# Patient Record
Sex: MALE | Race: WHITE | NOT HISPANIC OR LATINO | Employment: FULL TIME | URBAN - METROPOLITAN AREA
[De-identification: names, ages, dates, MRNs, and addresses within clinical notes are randomized per-mention and may not be internally consistent; named-entity substitution may affect disease eponyms.]

---

## 2023-12-04 ENCOUNTER — OFFICE VISIT (OUTPATIENT)
Dept: FAMILY MEDICINE | Facility: CLINIC | Age: 23
End: 2023-12-04
Payer: COMMERCIAL

## 2023-12-04 VITALS
TEMPERATURE: 98.7 F | OXYGEN SATURATION: 96 % | SYSTOLIC BLOOD PRESSURE: 135 MMHG | RESPIRATION RATE: 16 BRPM | HEART RATE: 80 BPM | DIASTOLIC BLOOD PRESSURE: 83 MMHG

## 2023-12-04 DIAGNOSIS — R22.1 NECK MASS: Primary | ICD-10-CM

## 2023-12-04 DIAGNOSIS — R23.9 SKIN CHANGE: ICD-10-CM

## 2023-12-04 DIAGNOSIS — B35.3 TINEA PEDIS OF BOTH FEET: ICD-10-CM

## 2023-12-04 PROCEDURE — 99203 OFFICE O/P NEW LOW 30 MIN: CPT | Performed by: PHYSICIAN ASSISTANT

## 2023-12-04 NOTE — PROGRESS NOTES
Assessment & Plan:      1. Neck mass  Consistent with lipoma, will get us to evaluate  - US Head Neck Soft Tissue; Future    2. Tinea pedis of both feet  Otc lamisil x 2-3 weeks    3. Skin change  Darkened skin in groin area without erythema or pruritus.  Ok to monitor          At the end of the encounter, I discussed results, diagnosis, medications. Discussed red flags for immediate return to clinic/ER, as well as indications for follow up if no improvement. Patient understood and agreed to plan. Patient was stable for discharge.        Stefanie Sprague PA-C on 12/4/2023 at 4:59 PM          Subjective:     HPI:    Trae is a 23 year old male who presents to clinic today for the following health issues:  Chief Complaint   Patient presents with    Neck Problem     Lump on Rt back side of neck, x few weeks, pain when moving neck to right side, no swelling, not warm to the touch, lump a bit smaller than golf ball     HPI      History obtained from the patient.    1)Lump on right neck for the past month.  No known injury, no recent illness, has been unchanged and is not tender to the touch.  Does have some pain with turning head to the left    2)Bumps on toes for the past month, pruritic if he scratches but otherwise do not bother him, has not used otc treatments    3)Discolored skin in groin for the past few months, no pruritus or irritation,  no injury to area        Review of Systems   ROS: 10 point ROS neg other than the symptoms noted above in the HPI.     There is no problem list on file for this patient.       History reviewed. No pertinent past medical history.    Social History     Tobacco Use    Smoking status: Never    Smokeless tobacco: Never   Substance Use Topics    Alcohol use: Not on file             Objective:     Vitals:    12/04/23 1650   BP: 135/83   Pulse: 80   Resp: 16   Temp: 98.7  F (37.1  C)   TempSrc: Oral   SpO2: 96%         Physical Exam   EXAM:   Pleasant, alert, appropriate appearance.  NAD.  Head Exam: Normocephalic, atraumatic.  Eye Exam:  PERRLA, EOMI, non icteric/injection.    Ear Exam: TMs grey without bulging. Normal canals.  Normal pinna.  Nose Exam: Normal external nose.    OroPharynx Exam:  Moist mucous membranes. No erythema, pharynx without exudate or hypertrophy.  Neck/Thyroid Exam:  No LAD.  No nodules or enlargement.  Chest/Respiratory Exam: CTAB.  Cardiovascular Exam: RRR. No murmur or rubs.  Ext/musculoskeletal: Grossly intact, No edema, DP pulses 2+ equal bilateral.  Neuro: CN II-XII intact grossly intact.  Skin: right posterior neck with 3cm soft mass, nttp.    Bilateral halux with peeling skin and small papules.  Groin with darking of skin in pubic area above penis, no erythema or edema.        Results:  No results found for any visits on 12/04/23.

## 2023-12-08 ENCOUNTER — HOSPITAL ENCOUNTER (OUTPATIENT)
Dept: ULTRASOUND IMAGING | Facility: HOSPITAL | Age: 23
Discharge: HOME | End: 2023-12-08
Attending: PHYSICIAN ASSISTANT | Admitting: PHYSICIAN ASSISTANT
Payer: COMMERCIAL

## 2023-12-08 DIAGNOSIS — R22.1 NECK MASS: ICD-10-CM

## 2023-12-08 PROCEDURE — 76536 US EXAM OF HEAD AND NECK: CPT

## 2023-12-31 ENCOUNTER — HEALTH MAINTENANCE LETTER (OUTPATIENT)
Age: 23
End: 2023-12-31

## 2024-08-01 ENCOUNTER — OFFICE VISIT (OUTPATIENT)
Dept: FAMILY MEDICINE | Facility: CLINIC | Age: 24
End: 2024-08-01
Payer: COMMERCIAL

## 2024-08-01 VITALS
SYSTOLIC BLOOD PRESSURE: 133 MMHG | BODY MASS INDEX: 23.72 KG/M2 | HEIGHT: 70 IN | DIASTOLIC BLOOD PRESSURE: 85 MMHG | WEIGHT: 165.7 LBS | HEART RATE: 90 BPM | OXYGEN SATURATION: 100 % | RESPIRATION RATE: 16 BRPM | TEMPERATURE: 98 F

## 2024-08-01 DIAGNOSIS — F32.2 CURRENT SEVERE EPISODE OF MAJOR DEPRESSIVE DISORDER WITHOUT PSYCHOTIC FEATURES WITHOUT PRIOR EPISODE (H): Primary | ICD-10-CM

## 2024-08-01 PROCEDURE — G2211 COMPLEX E/M VISIT ADD ON: HCPCS | Performed by: NURSE PRACTITIONER

## 2024-08-01 PROCEDURE — 96127 BRIEF EMOTIONAL/BEHAV ASSMT: CPT | Performed by: NURSE PRACTITIONER

## 2024-08-01 PROCEDURE — 99213 OFFICE O/P EST LOW 20 MIN: CPT | Performed by: NURSE PRACTITIONER

## 2024-08-01 RX ORDER — BUPROPION HYDROCHLORIDE 150 MG/1
150 TABLET ORAL EVERY MORNING
Qty: 30 TABLET | Refills: 1 | Status: SHIPPED | OUTPATIENT
Start: 2024-08-01 | End: 2024-09-25

## 2024-08-01 ASSESSMENT — PATIENT HEALTH QUESTIONNAIRE - PHQ9
10. IF YOU CHECKED OFF ANY PROBLEMS, HOW DIFFICULT HAVE THESE PROBLEMS MADE IT FOR YOU TO DO YOUR WORK, TAKE CARE OF THINGS AT HOME, OR GET ALONG WITH OTHER PEOPLE: VERY DIFFICULT
SUM OF ALL RESPONSES TO PHQ QUESTIONS 1-9: 21
SUM OF ALL RESPONSES TO PHQ QUESTIONS 1-9: 21

## 2024-08-01 NOTE — PROGRESS NOTES
Assessment & Plan     Current severe episode of major depressive disorder without psychotic features without prior episode (H)  Will trial wellbutrin to help with depression and adhd symptoms. We did discuss that this can contribute to worsening anxiety however patients main depression symptom is lack of motivation and desire to do anything and I think it will help with this symptoms. We did discuss adding trazodone for sleep-but will wait and see if that improves with depression treatment. We did discuss to take wellbutrin in the AM and not after 4 pm.     Continue therapy.    We did discuss that could increase dose of wellbutrin to 300 mg if not seeing effects at 300 mg. If getting anxious with wellbutrin will switch to ssri     Follow up to establish care in 4-6 week and recheck medication.    - buPROPion (WELLBUTRIN XL) 150 MG 24 hr tablet; Take 1 tablet (150 mg) by mouth every morning          Depression Screening Follow Up        8/1/2024     7:04 AM   PHQ   PHQ-9 Total Score 21   Q9: Thoughts of better off dead/self-harm past 2 weeks Not at all           Follow Up Actions Taken  Crisis resource information provided in After Visit Summary  Started patient on anti-depressant.           Subjective   Trae is a 23 year old, presenting for the following health issues:  Depression (Depression. Has been seeing a psychologist through [facility] for the past few months and has been diagnosed with moderate depression. Wants to pursue a medication treatment option- would like to discuss the next steps for that. Also has been having issues with insomnia since January of this year. )        8/1/2024     7:16 AM   Additional Questions   Roomed by Jessica     History of Present Illness       Mental Health Follow-up:  Patient presents to follow-up on Depression.Patient's depression since last visit has been:  No change  The patient is having other symptoms associated with depression.      Any  "significant life events: No  Patient is not feeling anxious or having panic attacks.  Patient has no concerns about alcohol or drug use.    Reason for visit:  Recently diagnosed with Major Depression and Insomnia  Symptom onset:  More than a month  Symptoms include:  Isolation; low motivation; low affect; difficulty falling asleep, staying asleep, and waking early  Symptom intensity:  Moderate  Symptom progression:  Staying the same  Had these symptoms before:  Yes  Has tried/received treatment for these symptoms:  No  What makes it worse:  No  What makes it better:  No    He eats 0-1 servings of fruits and vegetables daily.He consumes 1 sweetened beverage(s) daily.He exercises with enough effort to increase his heart rate 30 to 60 minutes per day.  He exercises with enough effort to increase his heart rate 4 days per week.   He is taking medications regularly.     Has never previously tried medications for depression and anxiety.  No thoughts of hurting self or others.    Insomnia-feels like has been noticeable since the 1st of this year-mixed bag of not being able to fall asleep quickly. Has tried melatonin 5 mg , 10mg, diphenhydramine (made more anxious restless). Has tried sleep hygiene-being in bed not on phone. Doesn't seem like racing thoughts-seems exhausted but can't fall asleep.                   Objective    /85 (BP Location: Right arm, Patient Position: Sitting, Cuff Size: Adult Regular)   Pulse 90   Temp 98  F (36.7  C) (Oral)   Resp 16   Ht 1.778 m (5' 10\")   Wt 75.2 kg (165 lb 11.2 oz)   SpO2 100%   BMI 23.78 kg/m    Body mass index is 23.78 kg/m .  Physical Exam  Constitutional:       Appearance: Normal appearance.   Cardiovascular:      Rate and Rhythm: Normal rate and regular rhythm.   Neurological:      General: No focal deficit present.      Mental Status: He is alert and oriented to person, place, and time.   Psychiatric:         Mood and Affect: Mood normal.         Behavior: " Behavior normal.         Thought Content: Thought content normal.            8/1/2024     7:04 AM   PHQ   PHQ-9 Total Score 21   Q9: Thoughts of better off dead/self-harm past 2 weeks Not at all           No data to display                             Signed Electronically by: IZZY HERNANDEZ CNP

## 2024-08-19 ENCOUNTER — TELEPHONE (OUTPATIENT)
Dept: FAMILY MEDICINE | Facility: CLINIC | Age: 24
End: 2024-08-19
Payer: COMMERCIAL

## 2024-08-19 DIAGNOSIS — F32.2 CURRENT SEVERE EPISODE OF MAJOR DEPRESSIVE DISORDER WITHOUT PSYCHOTIC FEATURES WITHOUT PRIOR EPISODE (H): Primary | ICD-10-CM

## 2024-08-19 NOTE — TELEPHONE ENCOUNTER
Reason for Call:  Other call back    Detailed comments: PT CALLING TODAY ABOUT MEDS PT HAS NOT HAD ANY IMPROVEMENT WITH MEDS AND WONDERING TO INCREASE MEDS TO 300MG PLEASE CALL AND ADVISE PT     Phone Number Patient can be reached at: Cell number on file:    Telephone Information:   Mobile [phone]       Best Time: ANYTIME    Can we leave a detailed message on this number? YES    Call taken on 8/19/2024 at 5:16 PM by Andrey Devi

## 2024-08-20 RX ORDER — BUPROPION HYDROCHLORIDE 150 MG/1
300 TABLET ORAL EVERY MORNING
Qty: 30 TABLET | Refills: 1 | Status: SHIPPED | OUTPATIENT
Start: 2024-08-20 | End: 2024-09-25

## 2024-08-20 NOTE — TELEPHONE ENCOUNTER
Last OV 8/1/2024:        Please advise if you would like to increase dose of Bupropion. Est Care Appointment for 9/17 with Angela.

## 2024-08-23 DIAGNOSIS — F32.2 CURRENT SEVERE EPISODE OF MAJOR DEPRESSIVE DISORDER WITHOUT PSYCHOTIC FEATURES WITHOUT PRIOR EPISODE (H): ICD-10-CM

## 2024-08-23 RX ORDER — BUPROPION HYDROCHLORIDE 150 MG/1
150 TABLET ORAL EVERY MORNING
Qty: 90 TABLET | Refills: 1 | OUTPATIENT
Start: 2024-08-23

## 2024-08-29 DIAGNOSIS — F32.2 CURRENT SEVERE EPISODE OF MAJOR DEPRESSIVE DISORDER WITHOUT PSYCHOTIC FEATURES WITHOUT PRIOR EPISODE (H): ICD-10-CM

## 2024-08-29 RX ORDER — BUPROPION HYDROCHLORIDE 150 MG/1
300 TABLET ORAL EVERY MORNING
Qty: 180 TABLET | Refills: 1 | OUTPATIENT
Start: 2024-08-29

## 2024-09-25 ENCOUNTER — OFFICE VISIT (OUTPATIENT)
Dept: FAMILY MEDICINE | Facility: CLINIC | Age: 24
End: 2024-09-25
Payer: COMMERCIAL

## 2024-09-25 VITALS
TEMPERATURE: 98.9 F | SYSTOLIC BLOOD PRESSURE: 130 MMHG | DIASTOLIC BLOOD PRESSURE: 70 MMHG | BODY MASS INDEX: 24.01 KG/M2 | WEIGHT: 167.7 LBS | HEART RATE: 89 BPM | RESPIRATION RATE: 14 BRPM | HEIGHT: 70 IN | OXYGEN SATURATION: 98 %

## 2024-09-25 DIAGNOSIS — F51.01 PRIMARY INSOMNIA: ICD-10-CM

## 2024-09-25 DIAGNOSIS — Z11.59 NEED FOR HEPATITIS C SCREENING TEST: ICD-10-CM

## 2024-09-25 DIAGNOSIS — F32.2 CURRENT SEVERE EPISODE OF MAJOR DEPRESSIVE DISORDER WITHOUT PSYCHOTIC FEATURES WITHOUT PRIOR EPISODE (H): ICD-10-CM

## 2024-09-25 DIAGNOSIS — Z11.4 SCREENING FOR HIV (HUMAN IMMUNODEFICIENCY VIRUS): ICD-10-CM

## 2024-09-25 DIAGNOSIS — Z00.00 ROUTINE GENERAL MEDICAL EXAMINATION AT A HEALTH CARE FACILITY: Primary | ICD-10-CM

## 2024-09-25 DIAGNOSIS — R41.840 ATTENTION AND CONCENTRATION DEFICIT: ICD-10-CM

## 2024-09-25 DIAGNOSIS — Z13.220 SCREENING FOR HYPERLIPIDEMIA: ICD-10-CM

## 2024-09-25 LAB
CHOLEST SERPL-MCNC: 162 MG/DL
FASTING STATUS PATIENT QL REPORTED: YES
HCV AB SERPL QL IA: NONREACTIVE
HDLC SERPL-MCNC: 47 MG/DL
HIV 1+2 AB+HIV1 P24 AG SERPL QL IA: NONREACTIVE
LDLC SERPL CALC-MCNC: 92 MG/DL
NONHDLC SERPL-MCNC: 115 MG/DL
TRIGL SERPL-MCNC: 115 MG/DL

## 2024-09-25 PROCEDURE — 90471 IMMUNIZATION ADMIN: CPT | Performed by: STUDENT IN AN ORGANIZED HEALTH CARE EDUCATION/TRAINING PROGRAM

## 2024-09-25 PROCEDURE — 90472 IMMUNIZATION ADMIN EACH ADD: CPT | Performed by: STUDENT IN AN ORGANIZED HEALTH CARE EDUCATION/TRAINING PROGRAM

## 2024-09-25 PROCEDURE — 99395 PREV VISIT EST AGE 18-39: CPT | Mod: 57 | Performed by: STUDENT IN AN ORGANIZED HEALTH CARE EDUCATION/TRAINING PROGRAM

## 2024-09-25 PROCEDURE — 91320 SARSCV2 VAC 30MCG TRS-SUC IM: CPT | Performed by: STUDENT IN AN ORGANIZED HEALTH CARE EDUCATION/TRAINING PROGRAM

## 2024-09-25 PROCEDURE — 90480 ADMN SARSCOV2 VAC 1/ONLY CMP: CPT | Performed by: STUDENT IN AN ORGANIZED HEALTH CARE EDUCATION/TRAINING PROGRAM

## 2024-09-25 PROCEDURE — 99214 OFFICE O/P EST MOD 30 MIN: CPT | Mod: 25 | Performed by: STUDENT IN AN ORGANIZED HEALTH CARE EDUCATION/TRAINING PROGRAM

## 2024-09-25 PROCEDURE — 87389 HIV-1 AG W/HIV-1&-2 AB AG IA: CPT | Performed by: STUDENT IN AN ORGANIZED HEALTH CARE EDUCATION/TRAINING PROGRAM

## 2024-09-25 PROCEDURE — 86803 HEPATITIS C AB TEST: CPT | Performed by: STUDENT IN AN ORGANIZED HEALTH CARE EDUCATION/TRAINING PROGRAM

## 2024-09-25 PROCEDURE — 80061 LIPID PANEL: CPT | Performed by: STUDENT IN AN ORGANIZED HEALTH CARE EDUCATION/TRAINING PROGRAM

## 2024-09-25 PROCEDURE — 90656 IIV3 VACC NO PRSV 0.5 ML IM: CPT | Performed by: STUDENT IN AN ORGANIZED HEALTH CARE EDUCATION/TRAINING PROGRAM

## 2024-09-25 PROCEDURE — 36415 COLL VENOUS BLD VENIPUNCTURE: CPT | Performed by: STUDENT IN AN ORGANIZED HEALTH CARE EDUCATION/TRAINING PROGRAM

## 2024-09-25 PROCEDURE — 90715 TDAP VACCINE 7 YRS/> IM: CPT | Performed by: STUDENT IN AN ORGANIZED HEALTH CARE EDUCATION/TRAINING PROGRAM

## 2024-09-25 RX ORDER — BUPROPION HYDROCHLORIDE 150 MG/1
450 TABLET ORAL EVERY MORNING
Qty: 270 TABLET | Refills: 3 | Status: SHIPPED | OUTPATIENT
Start: 2024-09-25 | End: 2024-10-01

## 2024-09-25 NOTE — PROGRESS NOTES
Preventive Care Visit  [facility]  Bill Miller MD, Family Medicine  Sep 25, 2024      Assessment & Plan     Routine general medical examination at a health care facility  Trae is a 23-year-old male who is presenting today for adult preventative exam, and establishing care.  He has a history of depression, more on this below.  We discussed screening as recommended per USPSTF guidelines for HIV, hepatitis C and hyperlipidemia, he was agreeable to having testing for these.    Recommended vaccinations for influenza, COVID and tetanus per CDC guidelines, he was agreeable and these were given today in clinic.    Examination today was grossly normal.    Screening for HIV (human immunodeficiency virus)    - HIV Antigen Antibody Combo    Need for hepatitis C screening test    - Hepatitis C Screen Reflex to HCV RNA Quant and Genotype    Screening for hyperlipidemia    - Lipid panel reflex to direct LDL Fasting    Current severe episode of major depressive disorder without psychotic features without prior episode (H)    Has been improved on Wellbutrin 300 mg XL daily.  Reports no side effects however still having some depression symptoms.  Like to see if he can go up on the medication dosing, I am agreeable to going up to 450 mg daily, monitoring for side effects as well as improvement over the next month.  He will check in with me in 1 month to notify me how things are going.  If they are not improving or there are side effects, would consider go back down to 300 mg Wellbutrin and add SSRI versus SNRI.  He is already seen a therapist, encouraged him to continue seeing his therapist.        - buPROPion (WELLBUTRIN XL) 150 MG 24 hr tablet  Dispense: 270 tablet; Refill: 3      Primary insomnia    Has been an issue with sleep initiation and sleep maintenance.  Has not improved with multiple different over-the-counter remedies to include melatonin, diphenhydramine and CBD Gummies.  Encouraged him to  attempt CBT-I, he will attempt sleepio karlo.  Discussed not sleeping until 30 minutes after he usually does go to sleep for 1 week and then slowly backing the time 15 minutes/week down until he is at goal of 8 hours sleep per night.    If he is not able to improve with this, would recommend for doxepin.  He will follow-up with me in 1 month    Attention and concentration deficit    He and therapist suspect that he has ADHD, they are working on getting him psychological testing for this, I encouraged the patient to follow-up with me once he has a diagnosis of ADHD, if he does get 1, and I am happy to help with pharmacotherapy.            Counseling  Appropriate preventive services were addressed with this patient via screening, questionnaire, or discussion as appropriate for fall prevention, nutrition, physical activity, Tobacco-use cessation, social engagement, weight loss and cognition.  Checklist reviewing preventive services available has been given to the patient.  Reviewed patient's diet, addressing concerns and/or questions.           Rohan Larkin is a 23 year old, presenting for the following:  Physical (ADHD, depression, and difficulty falling, staying asleep, and waking early.) and Establish Care        9/25/2024    12:39 PM   Additional Questions   Roomed by MARIA R FRANCES        Health Care Directive  Patient does not have a Health Care Directive or Living Will: Discussed advance care planning with patient; information given to patient to review.    HPI  Notices that his depression has improved.  Concentration has been a little better, can't concentrate more than an hour, but can focus more than 30 minutes which is an improvement.  He has been more active, doing more things out of the house. Has been working with a therapist since March 2024, is working on getting into a psychologist through them. Notes that over the past couple of years, that his memory is not as good as it was before. He will need to  look things up.     Has been having an issue with sleep since the start of 2024, but has been off and on a problem throughout his life.  Has been having issues with staying asleep, but also going to sleep.  He has been documenting this, since August 1st, had only 5 nights with 8 hours of sleep or more.  Sleep average is  usually about 6.5 to 7.5 hours a day. Sleep latency is 30 minutes to two hours, unaware of any variables that worsen this.  Has attempted melatonin, CBD gummies and benadryl without consistent results.  Melatonin seems to help the most.  Has discussed this with his therapist but not focusing therapy on this issue.             9/22/2024   General Health   How would you rate your overall physical health? Good   Feel stress (tense, anxious, or unable to sleep) To some extent      (!) STRESS CONCERN      9/22/2024   Nutrition   Three or more servings of calcium each day? Yes   Diet: Regular (no restrictions)   How many servings of fruit and vegetables per day? (!) 0-1   How many sweetened beverages each day? 0-1            9/22/2024   Exercise   Days per week of moderate/strenous exercise 4 days   Average minutes spent exercising at this level 60 min            9/22/2024   Social Factors   Frequency of gathering with friends or relatives Three times a week   Worry food won't last until get money to buy more No   Food not last or not have enough money for food? No   Do you have housing? (Housing is defined as stable permanent housing and does not include staying ouside in a car, in a tent, in an abandoned building, in an overnight shelter, or couch-surfing.) Yes   Are you worried about losing your housing? No   Lack of transportation? No   Unable to get utilities (heat,electricity)? No            9/22/2024   Dental   Dentist two times every year? Yes            9/22/2024   TB Screening   Were you born outside of the [place]? No            Today's PHQ-9 Score:       8/1/2024     7:04 AM   PHQ-9 SCORE   PHQ-9  Total Score MyChart 21 (Severe depression)   PHQ-9 Total Score 21           9/22/2024   Substance Use   Alcohol more than 3/day or more than 7/wk No   Do you use any other substances recreationally? (!) CANNABIS PRODUCTS        Social History     Tobacco Use    Smoking status: Never     Passive exposure: Never    Smokeless tobacco: Never   Vaping Use    Vaping status: Never Used             9/22/2024   One time HIV Screening   Previous HIV test? Yes          9/22/2024   STI Screening   New sexual partner(s) since last STI/HIV test? No            9/22/2024   Contraception/Family Planning   Questions about contraception or family planning No           Reviewed and updated as needed this visit by Provider                    Past Medical History:   Diagnosis Date    Depressive disorder July 1     History reviewed. No pertinent surgical history.  Labs reviewed in EPIC  BP Readings from Last 3 Encounters:   09/25/24 130/70   08/01/24 133/85   12/04/23 135/83    Wt Readings from Last 3 Encounters:   09/25/24 76.1 kg (167 lb 11.2 oz)   08/01/24 75.2 kg (165 lb 11.2 oz)                  Patient Active Problem List   Diagnosis    Hearing loss     History reviewed. No pertinent surgical history.    Social History     Tobacco Use    Smoking status: Never     Passive exposure: Never    Smokeless tobacco: Never   Substance Use Topics    Alcohol use: Yes     Alcohol/week: 1.0 - 2.0 standard drink of alcohol     Types: 1 - 2 Standard drinks or equivalent per week     Comment: 1x per week,     Family History   Problem Relation Age of Onset    Memory loss Mother         from a zoologic disease?    Parkinsonism Father 43    Depression Brother     Anxiety Disorder Brother     Mental Illness Brother     Insomnia Brother     Other Cancer Maternal Grandmother         Pancreatic cancer         No current outpatient medications on file.     No Known Allergies  No lab results found.       Review of Systems  Constitutional, neuro, ENT,  "endocrine, pulmonary, cardiac, gastrointestinal, genitourinary, musculoskeletal, integument and psychiatric systems are negative, except as otherwise noted.     Objective    Exam  /70 (BP Location: Right arm, Patient Position: Sitting, Cuff Size: Adult Regular)   Pulse 89   Temp 98.9  F (37.2  C) (Oral)   Resp 14   Ht 1.778 m (5' 10\")   Wt 76.1 kg (167 lb 11.2 oz)   SpO2 98%   BMI 24.06 kg/m     Estimated body mass index is 24.06 kg/m  as calculated from the following:    Height as of this encounter: 1.778 m (5' 10\").    Weight as of this encounter: 76.1 kg (167 lb 11.2 oz).    Physical Exam  GENERAL: alert and no distress  EYES: Eyes grossly normal to inspection, PERRL and conjunctivae and sclerae normal  HENT: ear canals and TM's normal, nose and mouth without ulcers or lesions  NECK: no adenopathy, no asymmetry, or scars.  Has 3 x 3 cm soft mobile mass at the same location where he had a neck ultrasound done last year showing lipoma.  RESP: lungs clear to auscultation - no rales, rhonchi or wheezes  CV: regular rate and rhythm, normal S1 S2, no S3 or S4, no murmur, click or rub, no peripheral edema  ABDOMEN: soft, nontender, no hepatosplenomegaly, no masses and bowel sounds normal  MS: no gross musculoskeletal defects noted, no edema  SKIN: no suspicious lesions or rashes  NEURO: Normal strength and tone, mentation intact and speech normal  PSYCH: mentation appears normal, affect normal/bright        Signed Electronically by: Bill Miller MD    "

## 2024-09-25 NOTE — PATIENT INSTRUCTIONS
Try the Sleepio karlo.  Try this behavioral intervention. If no improvement after one month, would try doxepin.   Patient Education   Preventive Care Advice   This is general advice given by our system to help you stay healthy. However, your care team may have specific advice just for you. Please talk to your care team about your preventive care needs.  Nutrition  Eat 5 or more servings of fruits and vegetables each day.  Try wheat bread, brown rice and whole grain pasta (instead of white bread, rice, and pasta).  Get enough calcium and vitamin D. Check the label on foods and aim for 100% of the RDA (recommended daily allowance).  Lifestyle  Exercise at least 150 minutes each week  (30 minutes a day, 5 days a week).  Do muscle strengthening activities 2 days a week. These help control your weight and prevent disease.  No smoking.  Wear sunscreen to prevent skin cancer.  Have a dental exam and cleaning every 6 months.  Yearly exams  See your health care team every year to talk about:  Any changes in your health.  Any medicines your care team has prescribed.  Preventive care, family planning, and ways to prevent chronic diseases.  Shots (vaccines)   HPV shots (up to age 26), if you've never had them before.  Hepatitis B shots (up to age 59), if you've never had them before.  COVID-19 shot: Get this shot when it's due.  Flu shot: Get a flu shot every year.  Tetanus shot: Get a tetanus shot every 10 years.  Pneumococcal, hepatitis A, and RSV shots: Ask your care team if you need these based on your risk.  Shingles shot (for age 50 and up)  General health tests  Diabetes screening:  Starting at age 35, Get screened for diabetes at least every 3 years.  If you are younger than age 35, ask your care team if you should be screened for diabetes.  Cholesterol test: At age 39, start having a cholesterol test every 5 years, or more often if advised.  Bone density scan (DEXA): At age 50, ask your care team if you should have this  scan for osteoporosis (brittle bones).  Hepatitis C: Get tested at least once in your life.  STIs (sexually transmitted infections)  Before age 24: Ask your care team if you should be screened for STIs.  After age 24: Get screened for STIs if you're at risk. You are at risk for STIs (including HIV) if:  You are sexually active with more than one person.  You don't use condoms every time.  You or a partner was diagnosed with a sexually transmitted infection.  If you are at risk for HIV, ask about PrEP medicine to prevent HIV.  Get tested for HIV at least once in your life, whether you are at risk for HIV or not.  Cancer screening tests  Cervical cancer screening: If you have a cervix, begin getting regular cervical cancer screening tests starting at age 21.  Breast cancer scan (mammogram): If you've ever had breasts, begin having regular mammograms starting at age 40. This is a scan to check for breast cancer.  Colon cancer screening: It is important to start screening for colon cancer at age 45.  Have a colonoscopy test every 10 years (or more often if you're at risk) Or, ask your provider about stool tests like a FIT test every year or Cologuard test every 3 years.  To learn more about your testing options, visit:   .  For help making a decision, visit:   https://bit.ly/ql04226.  Prostate cancer screening test: If you have a prostate, ask your care team if a prostate cancer screening test (PSA) at age 55 is right for you.  Lung cancer screening: If you are a current or former smoker ages 50 to 80, ask your care team if ongoing lung cancer screenings are right for you.  For informational purposes only. Not to replace the advice of your health care provider. Copyright   2023 [employer]. All rights reserved. Clinically reviewed by the [facility]. [employer] [id number] - REV 01/24.  Substance Use Disorder: Care Instructions  Overview     You can improve your life and health by  stopping your use of alcohol or drugs. When you don't drink or use drugs, you may feel and sleep better. You may get along better with your family, friends, and coworkers. There are medicines and programs that can help with substance use disorder.  How can you care for yourself at home?  Here are some ways to help you stay sober and prevent relapse.  If you have been given medicine to help keep you sober or reduce your cravings, be sure to take it exactly as prescribed.  Talk to your doctor about programs that can help you stop using drugs or drinking alcohol.  Do not keep alcohol or drugs in your home.  Plan ahead. Think about what you'll say if other people ask you to drink or use drugs. Try not to spend time with people who drink or use drugs.  Use the time and money spent on drinking or drugs to do something that's important to you.  Preventing a relapse  Have a plan to deal with relapse. Learn to recognize changes in your thinking that lead you to drink or use drugs. Get help before you start to drink or use drugs again.  Try to stay away from situations, friends, or places that may lead you to drink or use drugs.  If you feel the need to drink alcohol or use drugs again, seek help right away. Call a trusted friend or family member. Some people get support from organizations such as Narcotics Anonymous or [employer] or from treatment facilities.  If you relapse, get help as soon as you can. Some people make a plan with another person that outlines what they want that person to do for them if they relapse. The plan usually includes how to handle the relapse and who to notify in case of relapse.  Don't give up. Remember that a relapse doesn't mean that you have failed. Use the experience to learn the triggers that lead you to drink or use drugs. Then quit again. Recovery is a lifelong process. Many people have several relapses before they are able to quit for good.  Follow-up care is a key part of your  "treatment and safety. Be sure to make and go to all appointments, and call your doctor if you are having problems. It's also a good idea to know your test results and keep a list of the medicines you take.  When should you call for help?   Call 911  anytime you think you may need emergency care. For example, call if you or someone else:    Has overdosed or has withdrawal signs. Be sure to tell the emergency workers that you are or someone else is using or trying to quit using drugs. Overdose or withdrawal signs may include:  Losing consciousness.  Seizure.  Seeing or hearing things that aren't there (hallucinations).     Is thinking or talking about suicide or harming others.   Where to get help 24 hours a day, 7 days a week   If you or someone you know talks about suicide, self-harm, a mental health crisis, a substance use crisis, or any other kind of emotional distress, get help right away. You can:    Call the Suicide and Crisis Lifeline at 776.     Call 6-400-501-TALK (1-808.686.7335).     Text HOME to 745918 to access the Crisis Text Line.   Consider saving these numbers in your phone.  Go to Flowline for more information or to chat online.  Call your doctor now or seek immediate medical care if:    You are having withdrawal symptoms. These may include nausea or vomiting, sweating, shakiness, and anxiety.   Watch closely for changes in your health, and be sure to contact your doctor if:    You have a relapse.     You need more help or support to stop.   Where can you learn more?  Go to https://www.[facility].net/patiented  Enter H573 in the search box to learn more about \"Substance Use Disorder: Care Instructions.\"  Current as of: November 15, 2023               Content Version: 14.0    9758-0468 [employer].   Care instructions adapted under license by your healthcare professional. If you have questions about a medical condition or this instruction, always ask your healthcare professional. " [employer] disclaims any warranty or liability for your use of this information.

## 2024-09-29 ENCOUNTER — MYC MEDICAL ADVICE (OUTPATIENT)
Dept: FAMILY MEDICINE | Facility: CLINIC | Age: 24
End: 2024-09-29
Payer: COMMERCIAL

## 2024-09-30 NOTE — TELEPHONE ENCOUNTER
Outgoing call to patient's number in chart. When trying to call it said the Number is not in service.     Will respond on my chart and have the patient call us back so we can triage him further.     Anju PERAZA RN

## 2024-10-01 DIAGNOSIS — F32.2 CURRENT SEVERE EPISODE OF MAJOR DEPRESSIVE DISORDER WITHOUT PSYCHOTIC FEATURES WITHOUT PRIOR EPISODE (H): ICD-10-CM

## 2024-10-01 RX ORDER — BUPROPION HYDROCHLORIDE 150 MG/1
150 TABLET ORAL EVERY MORNING
Qty: 90 TABLET | Refills: 3 | Status: SHIPPED | OUTPATIENT
Start: 2024-10-01

## 2024-10-29 ENCOUNTER — MYC MEDICAL ADVICE (OUTPATIENT)
Dept: FAMILY MEDICINE | Facility: CLINIC | Age: 24
End: 2024-10-29
Payer: COMMERCIAL

## 2024-10-29 DIAGNOSIS — F51.01 PRIMARY INSOMNIA: Primary | ICD-10-CM

## 2024-10-29 RX ORDER — DOXEPIN HYDROCHLORIDE 10 MG/1
10 CAPSULE ORAL AT BEDTIME
Qty: 30 CAPSULE | Refills: 3 | Status: SHIPPED | OUTPATIENT
Start: 2024-10-29

## 2024-10-29 NOTE — TELEPHONE ENCOUNTER
Wrote the below message to patient directly:    Trae,    I am sorry to hear that you are not getting much better sleep.  I think we should try doxepin.  The typical dose of 3 or 6 mg is not covered by your insurance, because it is very expensive.  However the dose of 10 mg, which is less ideal but is still able to help significantly with insomnia, is covered.  Unfortunately it is a capsule, you will not be able to cut it in half to get a smaller dose.    If you be willing to give this a try, I would recommend taking doxepin 30 minutes before bedtime.  I would give this a try for a couple of weeks and see if there is improvement after regular nightly use.    If not seeing any improvement after a couple of weeks please let me know.    Best regards,    Bill Miller MD

## 2024-11-21 DIAGNOSIS — F51.01 PRIMARY INSOMNIA: ICD-10-CM

## 2024-11-21 RX ORDER — DOXEPIN HYDROCHLORIDE 10 MG/1
10 CAPSULE ORAL AT BEDTIME
Qty: 90 CAPSULE | Refills: 2 | Status: SHIPPED | OUTPATIENT
Start: 2024-11-21

## 2025-01-08 ENCOUNTER — MYC MEDICAL ADVICE (OUTPATIENT)
Dept: FAMILY MEDICINE | Facility: CLINIC | Age: 25
End: 2025-01-08
Payer: COMMERCIAL

## 2025-01-08 DIAGNOSIS — F51.01 PRIMARY INSOMNIA: Primary | ICD-10-CM

## 2025-01-08 DIAGNOSIS — F32.2 CURRENT SEVERE EPISODE OF MAJOR DEPRESSIVE DISORDER WITHOUT PSYCHOTIC FEATURES WITHOUT PRIOR EPISODE (H): ICD-10-CM

## 2025-01-08 RX ORDER — DOXEPIN HYDROCHLORIDE 10 MG/ML
3 SOLUTION ORAL AT BEDTIME
Qty: 118 ML | Refills: 0 | Status: SHIPPED | OUTPATIENT
Start: 2025-01-08

## 2025-01-08 RX ORDER — DOXEPIN 3 MG/1
3 TABLET, FILM COATED ORAL
Qty: 30 TABLET | Refills: 3 | Status: SHIPPED | OUTPATIENT
Start: 2025-01-08

## 2025-01-08 RX ORDER — BUPROPION HYDROCHLORIDE 450 MG/1
450 TABLET, FILM COATED, EXTENDED RELEASE ORAL EVERY MORNING
Qty: 90 TABLET | Refills: 3 | Status: SHIPPED | OUTPATIENT
Start: 2025-01-08

## 2025-01-08 NOTE — TELEPHONE ENCOUNTER
October 29, 2024  Bill Miller MD   AB    10/29/24  3:53 PM  Note  Wrote the below message to patient directly:     Trae,     I am sorry to hear that you are not getting much better sleep.  I think we should try doxepin.  The typical dose of 3 or 6 mg is not covered by your insurance, because it is very expensive.  However the dose of 10 mg, which is less ideal but is still able to help significantly with insomnia, is covered.  Unfortunately it is a capsule, you will not be able to cut it in half to get a smaller dose.     If you be willing to give this a try, I would recommend taking doxepin 30 minutes before bedtime.  I would give this a try for a couple of weeks and see if there is improvement after regular nightly use.     If not seeing any improvement after a couple of weeks please let me know.     Best regards,     Bill Miller MD

## 2025-01-09 NOTE — TELEPHONE ENCOUNTER
I have updated the prescription so that he is getting for 150 mg bupropion again.      I have changed the prescription of doxepin, because the 10 mg capsule was not working, we will try to get him doxepin 3 mg to take at night, anticipate improvement in insomnia.  If still having problems getting the 3 mg tablet, I have also ordered the 10 mg/mL doxepin solution which she can take 0.3 mL often get the same dosage.

## 2025-02-11 ENCOUNTER — E-VISIT (OUTPATIENT)
Dept: FAMILY MEDICINE | Facility: CLINIC | Age: 25
End: 2025-02-11
Payer: COMMERCIAL

## 2025-02-11 DIAGNOSIS — Z71.1 CONCERN ABOUT MEMORY: Primary | ICD-10-CM

## 2025-02-11 DIAGNOSIS — R41.840 ATTENTION AND CONCENTRATION DEFICIT: ICD-10-CM

## 2025-02-11 NOTE — PATIENT INSTRUCTIONS
Thank you for choosing us for your care. I have placed the below referral(s) for you:  Orders Placed This Encounter   Procedures     Adult Mental Health  Referral     Adult Mental Health  Referral     1 of these is for ADHD evaluation, the other is for neuropsych evaluation where they will do testing to see if there is memory loss.    Please click the link for your After Visit Summary to view scheduling instructions for your referral. In most cases, you will be contacted within 2 business days to schedule. If you do not hear from a representative within that time, please call [phone] to be connected to a .

## 2025-02-14 ENCOUNTER — MYC MEDICAL ADVICE (OUTPATIENT)
Dept: FAMILY MEDICINE | Facility: CLINIC | Age: 25
End: 2025-02-14
Payer: COMMERCIAL

## 2025-02-17 ENCOUNTER — MYC MEDICAL ADVICE (OUTPATIENT)
Dept: FAMILY MEDICINE | Facility: CLINIC | Age: 25
End: 2025-02-17
Payer: COMMERCIAL

## 2025-02-17 DIAGNOSIS — R41.840 ATTENTION AND CONCENTRATION DEFICIT: Primary | ICD-10-CM

## 2025-02-17 NOTE — TELEPHONE ENCOUNTER
I placed a new referral to [facility] for the patient to get his ADHD evaluation done there.  I notified him through patient message.  Closing note.

## 2025-02-17 NOTE — TELEPHONE ENCOUNTER
E-visit 2/11/25  Thank you for choosing us for your care. I have placed the below referral(s) for you:      Orders Placed This Encounter   Procedures    Adult Mental Health  Referral    Adult Mental Health  Referral      1 of these is for ADHD evaluation, the other is for neuropsych evaluation where they will do testing to see if there is memory loss.     Please click the link for your After Visit Summary to view scheduling instructions for your referral. In most cases, you will be contacted within 2 business days to schedule. If you do not hear from a representative within that time, please call [phone] to be connected to a .

## 2025-02-17 NOTE — PROGRESS NOTES
Glad to hear the neuropsychology referral worked out, I will place a new referral for ADHD evaluation to [facility].  You can reach them by calling at [phone].

## 2025-02-18 NOTE — TELEPHONE ENCOUNTER
Please have him attempt to reach back out to the neuropsychology clinic at [place] see if there are any other providers that be willing to take him on.  If not, then I will see him here in clinic and we will discuss his concerns there at an appointment.  There I can do a MoCA test which is a screening test for cognitive decline

## 2025-03-06 ENCOUNTER — MYC MEDICAL ADVICE (OUTPATIENT)
Dept: FAMILY MEDICINE | Facility: CLINIC | Age: 25
End: 2025-03-06
Payer: COMMERCIAL

## 2025-03-18 ENCOUNTER — MYC MEDICAL ADVICE (OUTPATIENT)
Dept: FAMILY MEDICINE | Facility: CLINIC | Age: 25
End: 2025-03-18
Payer: COMMERCIAL

## 2025-03-18 ASSESSMENT — PATIENT HEALTH QUESTIONNAIRE - PHQ9
10. IF YOU CHECKED OFF ANY PROBLEMS, HOW DIFFICULT HAVE THESE PROBLEMS MADE IT FOR YOU TO DO YOUR WORK, TAKE CARE OF THINGS AT HOME, OR GET ALONG WITH OTHER PEOPLE: VERY DIFFICULT
SUM OF ALL RESPONSES TO PHQ QUESTIONS 1-9: 12
SUM OF ALL RESPONSES TO PHQ QUESTIONS 1-9: 12

## 2025-03-18 NOTE — TELEPHONE ENCOUNTER
Trae,    In regards to the memory referral, right now the only supporting documentation I have is from the message that you sent me detailing your symptoms.  I do not have a evaluation that I can send them to show need for this.      If they are going to need me to provide some documentation for why this is necessary, then I would ask you to come in and do an appointment with me where we can do cognitive function testing.  This would help me to prove that you need the memory testing.    Best regards,    Bill Miller MD

## 2025-03-19 ENCOUNTER — VIRTUAL VISIT (OUTPATIENT)
Dept: FAMILY MEDICINE | Facility: CLINIC | Age: 25
End: 2025-03-19
Payer: COMMERCIAL

## 2025-03-19 DIAGNOSIS — F51.01 PRIMARY INSOMNIA: Primary | ICD-10-CM

## 2025-03-19 DIAGNOSIS — F32.2 CURRENT SEVERE EPISODE OF MAJOR DEPRESSIVE DISORDER WITHOUT PSYCHOTIC FEATURES WITHOUT PRIOR EPISODE (H): ICD-10-CM

## 2025-03-19 PROCEDURE — 96127 BRIEF EMOTIONAL/BEHAV ASSMT: CPT | Mod: 95 | Performed by: STUDENT IN AN ORGANIZED HEALTH CARE EDUCATION/TRAINING PROGRAM

## 2025-03-19 PROCEDURE — 98006 SYNCH AUDIO-VIDEO EST MOD 30: CPT | Performed by: STUDENT IN AN ORGANIZED HEALTH CARE EDUCATION/TRAINING PROGRAM

## 2025-03-19 RX ORDER — ZOLPIDEM TARTRATE 5 MG/1
5 TABLET ORAL
Qty: 15 TABLET | Refills: 2 | Status: SHIPPED | OUTPATIENT
Start: 2025-03-19

## 2025-03-19 RX ORDER — ESCITALOPRAM OXALATE 10 MG/1
10 TABLET ORAL DAILY
Qty: 30 TABLET | Refills: 3 | Status: SHIPPED | OUTPATIENT
Start: 2025-03-19

## 2025-03-19 NOTE — PROGRESS NOTES
Trae is a 24 year old who is being evaluated via a billable video visit.    How would you like to obtain your AVS? MyChart  If the video visit is dropped, the invitation should be resent by: Text to cell phone: [phone]  Will anyone else be joining your video visit? No      Assessment & Plan     Primary insomnia:  - Difficulty falling and staying asleep. Previous treatment with doxepin was ineffective. Considering Ambien (zolpidem) as a treatment option.  - Start Ambien at 5 mg, assess effectiveness after one week. If effective, continue with refills; if not, increase to 10 mg and reassess. Consider extended-release formulation if waking up during the night persists. Monitor for unusual sleep behaviors and discontinue if they occur.  - Risks and side effects: Potential for unusual sleep behaviors (e.g., sleepwalking, sleep-driving). Increased risk with concurrent use of other sedatives or alcohol. Possible morning grogginess.    Current severe episode of major depressive disorder without psychotic features without prior episode (H):  - Current treatment with Wellbutrin 450 daily. Considering additional treatment with Lexapro.  - Start Lexapro at 10 mg. Continue wellbutrin.  Monitor for effectiveness and side effects, with follow-up on April 7, 2025. Discuss potential side effects and black box warning for increased suicidal thoughts in the initial weeks.  - Risks and side effects: Potential for increased suicidal thoughts in the first two weeks. Possible gastrointestinal side effects (e.g., diarrhea, cramping, nausea) that typically resolve after two weeks.    Consent was obtained from the patient to use an AI documentation tool in the creation of this note.    The longitudinal plan of care for the diagnosis(es)/condition(s) as documented were addressed during this visit. Due to the added complexity in care, I will continue to support Trae in the subsequent management and with ongoing continuity of  care.        Depression Screening Follow Up        3/18/2025     2:08 PM   PHQ   PHQ-9 Total Score 12    Q9: Thoughts of better off dead/self-harm past 2 weeks Not at all       Patient-reported         3/18/2025     2:08 PM   Last PHQ-9   1.  Little interest or pleasure in doing things 1   2.  Feeling down, depressed, or hopeless 0   3.  Trouble falling or staying asleep, or sleeping too much 3   4.  Feeling tired or having little energy 3   5.  Poor appetite or overeating 2   6.  Feeling bad about yourself 0   7.  Trouble concentrating 2   8.  Moving slowly or restless 1   Q9: Thoughts of better off dead/self-harm past 2 weeks 0   PHQ-9 Total Score 12        Patient-reported         Follow Up Actions Taken  Crisis resource information provided in After Visit Summary  Started patient on anti-depressant.       Follow-up in 1 month    Subjective   Trae is a 24 year old, presenting for the following health issues:  Follow Up (Discuss prescription for new sleep medication.)        3/19/2025     2:07 PM   Additional Questions   Roomed by MARIA R FRANCES     History of Present Illness       Reason for visit:  Sleep difficulty    He eats 0-1 servings of fruits and vegetables daily.He consumes 0 sweetened beverage(s) daily.He exercises with enough effort to increase his heart rate 30 to 60 minutes per day.  He exercises with enough effort to increase his heart rate 6 days per week.   He is taking medications regularly.      History of Present Illness-  - Trae Gimlore, 24-year-old male.  - Reports inconsistent sleep patterns with difficulty falling and staying asleep.  - In January, had five nights of 8-hour sleep; in February, eight nights; and in March, seven nights so far.  - Previous use of doxepin was ineffective for sleep issues.  - Currently experiencing challenges with memory, requiring further evaluation.  - Previously on Wellbutrin, noted increased emotionality when dosage was higher.            Review of  Systems  Constitutional, neuro, ENT, endocrine, pulmonary, cardiac, gastrointestinal, genitourinary, musculoskeletal, integument and psychiatric systems are negative, except as otherwise noted.      Objective           Vitals:  No vitals were obtained today due to virtual visit.    Physical Exam   GENERAL: alert and no distress  EYES: Eyes grossly normal to inspection.  No discharge or erythema, or obvious scleral/conjunctival abnormalities.  RESP: No audible wheeze, cough, or visible cyanosis.    SKIN: Visible skin clear. No significant rash, abnormal pigmentation or lesions.  NEURO: Cranial nerves grossly intact.  Mentation and speech appropriate for age.  PSYCH: Appropriate affect, tone, and pace of words    Office Visit on 09/25/2024   Component Date Value Ref Range Status    Cholesterol 09/25/2024 162  <200 mg/dL Final    Triglycerides 09/25/2024 115  <150 mg/dL Final    Direct Measure HDL 09/25/2024 47  >=40 mg/dL Final    LDL Cholesterol Calculated 09/25/2024 92  <100 mg/dL Final    Non HDL Cholesterol 09/25/2024 115  <130 mg/dL Final    Patient Fasting > 8hrs? 09/25/2024 Yes   Final    HIV Antigen Antibody Combo 09/25/2024 Nonreactive  Nonreactive Final    Negative HIV-1 p24 antigen and HIV-1/2 antibody screening test results usually indicate the absence of HIV-1 and HIV-2 infection. However, such negative results do not rule-out acute HIV infection.  If acute HIV-1 or HIV-2 infection is suspected, detection of HIV-1 or HIV-2 RNA  is recommended.     Hepatitis C Antibody 09/25/2024 Nonreactive  Nonreactive Final    A nonreactive screening test result does not exclude the possibility of exposure to or infection with HCV. Nonreactive screening test results in individuals with prior exposure to HCV may be due to antibody levels below the limit of detection of this assay or lack of reactivity to the HCV antigens used in this assay. Patients with recent HCV infections (<3 months from time of exposure) may have  false-negative HCV antibody results due to the time needed for seroconversion (average of 8 to 9 weeks).         Video-Visit Details    Type of service:  Video Visit   Originating Location (pt. Location): Home    Distant Location (provider location):  On-site  Platform used for Video Visit: Ying  Signed Electronically by: Bill Miller MD

## 2025-03-28 ENCOUNTER — MYC MEDICAL ADVICE (OUTPATIENT)
Dept: FAMILY MEDICINE | Facility: CLINIC | Age: 25
End: 2025-03-28
Payer: COMMERCIAL

## 2025-03-28 DIAGNOSIS — F51.01 PRIMARY INSOMNIA: ICD-10-CM

## 2025-03-28 NOTE — TELEPHONE ENCOUNTER
VV 3/19/25  Assessment & Plan  Primary insomnia:  - Difficulty falling and staying asleep. Previous treatment with doxepin was ineffective. Considering Ambien (zolpidem) as a treatment option.  - Start Ambien at 5 mg, assess effectiveness after one week. If effective, continue with refills; if not, increase to 10 mg and reassess. Consider extended-release formulation if waking up during the night persists. Monitor for unusual sleep behaviors and discontinue if they occur.  - Risks and side effects: Potential for unusual sleep behaviors (e.g., sleepwalking, sleep-driving). Increased risk with concurrent use of other sedatives or alcohol. Possible morning grogginess.

## 2025-03-29 ENCOUNTER — MYC MEDICAL ADVICE (OUTPATIENT)
Dept: FAMILY MEDICINE | Facility: CLINIC | Age: 25
End: 2025-03-29
Payer: COMMERCIAL

## 2025-03-29 DIAGNOSIS — F51.01 PRIMARY INSOMNIA: ICD-10-CM

## 2025-03-29 NOTE — TELEPHONE ENCOUNTER
Trae,     I want you to try taking two of the tablets at night instead of one, lets see if that improves your sleep.    Best regards,    Bill Miller MD

## 2025-04-02 RX ORDER — ZOLPIDEM TARTRATE 5 MG/1
10 TABLET ORAL
Qty: 60 TABLET | Refills: 1 | Status: SHIPPED | OUTPATIENT
Start: 2025-04-02

## 2025-04-04 ENCOUNTER — TELEPHONE (OUTPATIENT)
Dept: FAMILY MEDICINE | Facility: CLINIC | Age: 25
End: 2025-04-04

## 2025-04-04 DIAGNOSIS — F51.01 PRIMARY INSOMNIA: ICD-10-CM

## 2025-04-04 NOTE — TELEPHONE ENCOUNTER
Please start PA for zolpidem (AMBIEN) 5 MG tablet    [General Appearance - Well Developed] : well developed [Normal Appearance] : normal appearance [Well Groomed] : well groomed [General Appearance - Well Nourished] : well nourished [No Deformities] : no deformities [General Appearance - In No Acute Distress] : no acute distress [Normal Conjunctiva] : the conjunctiva exhibited no abnormalities [Eyelids - No Xanthelasma] : the eyelids demonstrated no xanthelasmas [FreeTextEntry1] : JVP normal. NO bruits [Respiration, Rhythm And Depth] : normal respiratory rhythm and effort [Exaggerated Use Of Accessory Muscles For Inspiration] : no accessory muscle use [Auscultation Breath Sounds / Voice Sounds] : lungs were clear to auscultation bilaterally [Heart Sounds] : normal S1 and S2 [Heart Rate And Rhythm] : heart rate and rhythm were normal [Murmurs] : no murmurs present [Arterial Pulses Normal] : the arterial pulses were normal [Edema] : no peripheral edema present [Nail Clubbing] : no clubbing of the fingernails [Cyanosis, Localized] : no localized cyanosis [Petechial Hemorrhages (___cm)] : no petechial hemorrhages [Skin Turgor] : normal skin turgor [] : no rash [Affect] : the affect was normal [Mood] : the mood was normal

## 2025-04-07 ENCOUNTER — OFFICE VISIT (OUTPATIENT)
Dept: FAMILY MEDICINE | Facility: CLINIC | Age: 25
End: 2025-04-07
Payer: COMMERCIAL

## 2025-04-07 VITALS
OXYGEN SATURATION: 97 % | BODY MASS INDEX: 22.92 KG/M2 | TEMPERATURE: 97.8 F | HEIGHT: 70 IN | DIASTOLIC BLOOD PRESSURE: 68 MMHG | WEIGHT: 160.1 LBS | SYSTOLIC BLOOD PRESSURE: 110 MMHG | RESPIRATION RATE: 14 BRPM | HEART RATE: 90 BPM

## 2025-04-07 DIAGNOSIS — F51.01 PRIMARY INSOMNIA: ICD-10-CM

## 2025-04-07 DIAGNOSIS — F32.A ANXIETY AND DEPRESSION: ICD-10-CM

## 2025-04-07 DIAGNOSIS — F41.9 ANXIETY AND DEPRESSION: ICD-10-CM

## 2025-04-07 DIAGNOSIS — Z71.1 CONCERN ABOUT MEMORY: Primary | ICD-10-CM

## 2025-04-07 LAB
BASOPHILS # BLD AUTO: 0 10E3/UL (ref 0–0.2)
BASOPHILS NFR BLD AUTO: 1 %
EOSINOPHIL # BLD AUTO: 0.2 10E3/UL (ref 0–0.7)
EOSINOPHIL NFR BLD AUTO: 2 %
ERYTHROCYTE [DISTWIDTH] IN BLOOD BY AUTOMATED COUNT: 11.3 % (ref 10–15)
HCT VFR BLD AUTO: 46.1 % (ref 40–53)
HGB BLD-MCNC: 16.2 G/DL (ref 13.3–17.7)
IMM GRANULOCYTES # BLD: 0 10E3/UL
IMM GRANULOCYTES NFR BLD: 0 %
LYMPHOCYTES # BLD AUTO: 1.2 10E3/UL (ref 0.8–5.3)
LYMPHOCYTES NFR BLD AUTO: 18 %
MCH RBC QN AUTO: 32 PG (ref 26.5–33)
MCHC RBC AUTO-ENTMCNC: 35.1 G/DL (ref 31.5–36.5)
MCV RBC AUTO: 91 FL (ref 78–100)
MONOCYTES # BLD AUTO: 0.8 10E3/UL (ref 0–1.3)
MONOCYTES NFR BLD AUTO: 12 %
NEUTROPHILS # BLD AUTO: 4.4 10E3/UL (ref 1.6–8.3)
NEUTROPHILS NFR BLD AUTO: 67 %
PLATELET # BLD AUTO: 259 10E3/UL (ref 150–450)
RBC # BLD AUTO: 5.07 10E6/UL (ref 4.4–5.9)
WBC # BLD AUTO: 6.5 10E3/UL (ref 4–11)

## 2025-04-07 PROCEDURE — 3078F DIAST BP <80 MM HG: CPT | Performed by: STUDENT IN AN ORGANIZED HEALTH CARE EDUCATION/TRAINING PROGRAM

## 2025-04-07 PROCEDURE — 84443 ASSAY THYROID STIM HORMONE: CPT | Performed by: STUDENT IN AN ORGANIZED HEALTH CARE EDUCATION/TRAINING PROGRAM

## 2025-04-07 PROCEDURE — 80053 COMPREHEN METABOLIC PANEL: CPT | Performed by: STUDENT IN AN ORGANIZED HEALTH CARE EDUCATION/TRAINING PROGRAM

## 2025-04-07 PROCEDURE — 36415 COLL VENOUS BLD VENIPUNCTURE: CPT | Performed by: STUDENT IN AN ORGANIZED HEALTH CARE EDUCATION/TRAINING PROGRAM

## 2025-04-07 PROCEDURE — 3074F SYST BP LT 130 MM HG: CPT | Performed by: STUDENT IN AN ORGANIZED HEALTH CARE EDUCATION/TRAINING PROGRAM

## 2025-04-07 PROCEDURE — 82607 VITAMIN B-12: CPT | Performed by: STUDENT IN AN ORGANIZED HEALTH CARE EDUCATION/TRAINING PROGRAM

## 2025-04-07 PROCEDURE — 99214 OFFICE O/P EST MOD 30 MIN: CPT | Performed by: STUDENT IN AN ORGANIZED HEALTH CARE EDUCATION/TRAINING PROGRAM

## 2025-04-07 PROCEDURE — G2211 COMPLEX E/M VISIT ADD ON: HCPCS | Performed by: STUDENT IN AN ORGANIZED HEALTH CARE EDUCATION/TRAINING PROGRAM

## 2025-04-07 PROCEDURE — 85025 COMPLETE CBC W/AUTO DIFF WBC: CPT | Performed by: STUDENT IN AN ORGANIZED HEALTH CARE EDUCATION/TRAINING PROGRAM

## 2025-04-07 RX ORDER — VENLAFAXINE 37.5 MG/1
37.5 TABLET ORAL DAILY
Qty: 30 TABLET | Refills: 0 | Status: SHIPPED | OUTPATIENT
Start: 2025-04-07

## 2025-04-07 ASSESSMENT — ANXIETY QUESTIONNAIRES
4. TROUBLE RELAXING: SEVERAL DAYS
3. WORRYING TOO MUCH ABOUT DIFFERENT THINGS: SEVERAL DAYS
GAD7 TOTAL SCORE: 8
7. FEELING AFRAID AS IF SOMETHING AWFUL MIGHT HAPPEN: NOT AT ALL
7. FEELING AFRAID AS IF SOMETHING AWFUL MIGHT HAPPEN: NOT AT ALL
1. FEELING NERVOUS, ANXIOUS, OR ON EDGE: SEVERAL DAYS
GAD7 TOTAL SCORE: 8
5. BEING SO RESTLESS THAT IT IS HARD TO SIT STILL: NEARLY EVERY DAY
2. NOT BEING ABLE TO STOP OR CONTROL WORRYING: SEVERAL DAYS
6. BECOMING EASILY ANNOYED OR IRRITABLE: SEVERAL DAYS
IF YOU CHECKED OFF ANY PROBLEMS ON THIS QUESTIONNAIRE, HOW DIFFICULT HAVE THESE PROBLEMS MADE IT FOR YOU TO DO YOUR WORK, TAKE CARE OF THINGS AT HOME, OR GET ALONG WITH OTHER PEOPLE: SOMEWHAT DIFFICULT
GAD7 TOTAL SCORE: 8
8. IF YOU CHECKED OFF ANY PROBLEMS, HOW DIFFICULT HAVE THESE MADE IT FOR YOU TO DO YOUR WORK, TAKE CARE OF THINGS AT HOME, OR GET ALONG WITH OTHER PEOPLE?: SOMEWHAT DIFFICULT

## 2025-04-07 NOTE — PROGRESS NOTES
Assessment & Plan     Concern about memory  Trae reports difficulty with remembering names of people and things, he does not have a hard time remembering except in the moment, he remembered them afterwards and before.  He does not have a hard time remembering significant events in his life, the date, and can assimilate new information.  He is doing well and his performance at his job.    MoCA testing was done, 28 out of 30.  This is a normal result, and this is very reassuring that there is no mild cognitive decline.    Recommend that we do vitamin B12, thyroid, CBC and CMP testing.    Believe that the most likely cause of his symptoms is undiagnosed ADHD, currently is getting an appointment to have this evaluated.  Also suspect depression and anxiety to be positive for contributing.  We will discuss this further below.    - Vitamin B12  - TSH with free T4 reflex  - CBC with platelets and differential  - Comprehensive metabolic panel (BMP + Alb, Alk Phos, ALT, AST, Total. Bili, TP)  - Vitamin B12  - TSH with free T4 reflex  - CBC with platelets and differential  - Comprehensive metabolic panel (BMP + Alb, Alk Phos, ALT, AST, Total. Bili, TP)    Primary insomnia  He has insomnia, on Ambien, 10 mg had been effective.  Currently working on getting prior authorization, believe this will be effective and anticipate doing this treatment for 3 to 6 months.  Believe this will also help to improve his mentation.    Anxiety and depression  Did not tolerate Lexapro at 10 mg, reporting increased anxiety.  Had had significant improvement on Wellbutrin at 450 mg daily.  We will continue Wellbutrin 450 mg daily, and attempt to add Effexor 37.5 mg daily.  Monitor for improvement in anxiety and depression.  If tolerating venlafaxine well we will attempt to increase to 75 mg daily in 2 weeks.  He may do the self increase on his own if feeling well.  He should follow-up with me in 4-6 weeks.      Therapy offered, he would prefer  to stay with pharmacotherapy for now, if this is not improving his symptoms I would more strongly recommend therapy.  Also possible that if he has ADHD that will improve his mood if he does get treatment for that as well.    - venlafaxine (EFFEXOR) 37.5 MG tablet  Dispense: 30 tablet; Refill: 0            Subjective   Trae is a 24 year old, presenting for the following health issues:  Memory Testing (Jameel Tran requesting for supporting documentation prior to appointment.) and Follow Up (Medication - lexapro)        4/7/2025     3:43 PM   Additional Questions   Roomed by MARIA R FRANCES     History of Present Illness       Mental Health Follow-up:  Patient presents to follow-up on Depression & Anxiety.Patient's depression since last visit has been:  Medium  The patient is not having other symptoms associated with depression.  Patient's anxiety since last visit has been:  Worse  The patient is having other symptoms associated with anxiety.  Any significant life events: No  Patient is feeling anxious or having panic attacks.  Patient has no concerns about alcohol or drug use.    He eats 2-3 servings of fruits and vegetables daily.He consumes 0 sweetened beverage(s) daily.He exercises with enough effort to increase his heart rate 30 to 60 minutes per day.  He exercises with enough effort to increase his heart rate 6 days per week.   He is taking medications regularly.        Trae reports that he was not able to tolerate the Lexapro, made him very anxious, he started having a panic attack after starting it only had 1 such episode but felt overall anxious on this medication and would like to stop Lexapro.  Wonders if there is anything else that could help with depression as he still has some depression and anxiety.    He has had memory loss, he has had a hard time with remembering the names of places and things.  It happens mostly in the moments he can remember what he is looking for with the word and then later he will  "be able to remember.  We also member the name sometimes before but not during the time when he needs to remember it.  He does not have any difficulty remembering important events in his past or the names of people he has particularly close to like family members and close friends.  He is not have a hard time remembering new things that he does learned, if does not they do with names.          Review of Systems  Constitutional, neuro, ENT, endocrine, pulmonary, cardiac, gastrointestinal, genitourinary, musculoskeletal, integument and psychiatric systems are negative, except as otherwise noted.      Objective    /68 (BP Location: Right arm, Patient Position: Sitting, Cuff Size: Adult Regular)   Pulse 101   Temp 97.8  F (36.6  C)   Resp 14   Ht 1.778 m (5' 10\")   Wt 72.6 kg (160 lb 1.6 oz)   SpO2 97%   BMI 22.97 kg/m    Body mass index is 22.97 kg/m .  Physical Exam   GENERAL: alert and no distress  RESP: lungs clear to auscultation - no rales, rhonchi or wheezes  CV: regular rate and rhythm, normal S1 S2, no S3 or S4, no murmur, click or rub, no peripheral edema  MS: no gross musculoskeletal defects noted, no edema  PSYCH: mentation appears normal, affect flat, anxious, judgement and insight intact, and appearance well groomed    Office Visit on 09/25/2024   Component Date Value Ref Range Status    Cholesterol 09/25/2024 162  <200 mg/dL Final    Triglycerides 09/25/2024 115  <150 mg/dL Final    Direct Measure HDL 09/25/2024 47  >=40 mg/dL Final    LDL Cholesterol Calculated 09/25/2024 92  <100 mg/dL Final    Non HDL Cholesterol 09/25/2024 115  <130 mg/dL Final    Patient Fasting > 8hrs? 09/25/2024 Yes   Final    HIV Antigen Antibody Combo 09/25/2024 Nonreactive  Nonreactive Final    Negative HIV-1 p24 antigen and HIV-1/2 antibody screening test results usually indicate the absence of HIV-1 and HIV-2 infection. However, such negative results do not rule-out acute HIV infection.  If acute HIV-1 or HIV-2 " infection is suspected, detection of HIV-1 or HIV-2 RNA  is recommended.     Hepatitis C Antibody 09/25/2024 Nonreactive  Nonreactive Final    A nonreactive screening test result does not exclude the possibility of exposure to or infection with HCV. Nonreactive screening test results in individuals with prior exposure to HCV may be due to antibody levels below the limit of detection of this assay or lack of reactivity to the HCV antigens used in this assay. Patients with recent HCV infections (<3 months from time of exposure) may have false-negative HCV antibody results due to the time needed for seroconversion (average of 8 to 9 weeks).       The longitudinal plan of care for the diagnosis(es)/condition(s) as documented were addressed during this visit. Due to the added complexity in care, I will continue to support Trae in the subsequent management and with ongoing continuity of care.      Signed Electronically by: Bill Miller MD

## 2025-04-08 LAB
ALBUMIN SERPL BCG-MCNC: 4.6 G/DL (ref 3.5–5.2)
ALP SERPL-CCNC: 75 U/L (ref 40–150)
ALT SERPL W P-5'-P-CCNC: 16 U/L (ref 0–70)
ANION GAP SERPL CALCULATED.3IONS-SCNC: 10 MMOL/L (ref 7–15)
AST SERPL W P-5'-P-CCNC: 28 U/L (ref 0–45)
BILIRUB SERPL-MCNC: 0.4 MG/DL
BUN SERPL-MCNC: 12.4 MG/DL (ref 6–20)
CALCIUM SERPL-MCNC: 9.3 MG/DL (ref 8.8–10.4)
CHLORIDE SERPL-SCNC: 104 MMOL/L (ref 98–107)
CREAT SERPL-MCNC: 1.22 MG/DL (ref 0.67–1.17)
EGFRCR SERPLBLD CKD-EPI 2021: 85 ML/MIN/1.73M2
GLUCOSE SERPL-MCNC: 88 MG/DL (ref 70–99)
HCO3 SERPL-SCNC: 25 MMOL/L (ref 22–29)
POTASSIUM SERPL-SCNC: 4.1 MMOL/L (ref 3.4–5.3)
PROT SERPL-MCNC: 7.2 G/DL (ref 6.4–8.3)
SODIUM SERPL-SCNC: 139 MMOL/L (ref 135–145)
TSH SERPL DL<=0.005 MIU/L-ACNC: 1.07 UIU/ML (ref 0.3–4.2)
VIT B12 SERPL-MCNC: 427 PG/ML (ref 232–1245)

## 2025-04-08 NOTE — TELEPHONE ENCOUNTER
Retail Pharmacy Prior Authorization Team   Phone: [phone]    PA Initiation    Medication: ZOLPIDEM TARTRATE 5 MG PO TABS  Insurance Company: [employer] Non-Specialty PA's - Phone [phone] Fax [phone]  Pharm[facility] Filling the Rx[id number] [place], [place] - [address] [facility]  Filling Pharmacy Phone: [phone]  Filling Pharmacy Fax:    Start Date: 4/8/2025

## 2025-04-08 NOTE — TELEPHONE ENCOUNTER
Retail Pharmacy Prior Authorization Team   Phone: [phone]    INSURANCE APPROVED UP TO ONE TABLET DAILY (PREVIOUSLY ONLY ALLOWED #15/23 DAYS ON A ROLLING CALENDAR)   #60 FOR 30 DAYS WAS DENIED    Prior Authorization Approval    Medication: ZOLPIDEM TARTRATE 5 MG PO TABS  Authorization Effective Date: 3/9/2025  Authorization Expiration Date: 7/7/2025  Approved Dose/Quantity: UP TO ONE TAB DAILY, #34 FOR 34 DAYS ON A ROLLING CALENDAR.   LAST FILLED 03/19/2025 #15. Pt COULD FILL #19 NOW, BUT WOULD BE STUCK IN A PARTIAL-FILL RABBIT HOLE DUE TO THE ROLLING CALENDAR. OR WAIT TIL 4/19/2025 FOR  #34 TABS. WHICH IS STILL NOT THE FULL QTY REQUESTED. (#60 FOR 30 DAYS WAS DENIED)  Reference #:[id number]     Insurance Company: [employer] Non-Specialty PA's - Phone [phone] Fax [phone]  Which [facility] is [facility] the [id number] [place], [place] - [address] [place] [address]  Pharmacy Notified: YES  Patient Notified: **Instructed pharmacy to notify patient when script is ready to /ship.**

## 2025-04-09 ENCOUNTER — TELEPHONE (OUTPATIENT)
Dept: FAMILY MEDICINE | Facility: CLINIC | Age: 25
End: 2025-04-09
Payer: COMMERCIAL

## 2025-04-09 DIAGNOSIS — R79.89 ELEVATED SERUM CREATININE: Primary | ICD-10-CM

## 2025-04-09 RX ORDER — ZOLPIDEM TARTRATE 10 MG/1
10 TABLET ORAL
Qty: 30 TABLET | Refills: 2 | Status: SHIPPED | OUTPATIENT
Start: 2025-04-09

## 2025-04-09 NOTE — TELEPHONE ENCOUNTER
Retail Pharmacy Prior Authorization Team   Phone: [phone]    PA Initiation    Medication: ZOLPIDEM TARTRATE 10 MG PO TABS  Insurance Company: [employer] Non-Specialty PA's - Phone [phone] Fax [phone]  [id number] [place], [place] - [address] [facility]  Filling Pharmacy Phone: [phone]  Filling Pharmacy Fax:    Start Date: 4/9/2025

## 2025-04-09 NOTE — TELEPHONE ENCOUNTER
Retail Pharmacy Prior Authorization Team   Phone: [phone]    Prior Authorization Approval    Medication: ZOLPIDEM TARTRATE 10 MG PO TABS  Authorization Effective Date: 3/9/2025  Authorization Expiration Date: 7/7/2025  Approved Dose/Quantity: ONE DAILY, #34 FOR 34 DAYS   Reference #:  [id number]   Insurance Company: [employer] Non-Specialty PA's - Phone [phone] Fax [phone]  [id number] [place] [address]  Pharmacy Notified: YES  Patient Notified: **Instructed pharmacy to notify patient when script is ready to /ship.**

## 2025-04-09 NOTE — TELEPHONE ENCOUNTER
I would like to try to change to the medication to the Ambien 10 mg tablet to reflect the dose of 10 mg nightly, and then send that back in for prior authorization to see if that will be approved prior to doing any letter of medical necessity as it is not necessary for him to use the 5 mg tablets.

## 2025-04-09 NOTE — TELEPHONE ENCOUNTER
I called the pharmacy to find out if the 10mg tablets went through. They said insurance limited the fill to #15 days. I am assuming this is the same as the 5mg tabs were before, that were only approved #15 per 23 days. I will create a new TE to initiate a PA for the 10mg tablets once daily #34 for 34 days.     Please close this TE when finished and see new TE dated 04/09/2025 for 10mg tablet PA outcomes.     Thank you!  Suzan Shoemaker [facility] Team

## 2025-04-11 ENCOUNTER — MYC MEDICAL ADVICE (OUTPATIENT)
Dept: FAMILY MEDICINE | Facility: CLINIC | Age: 25
End: 2025-04-11
Payer: COMMERCIAL

## 2025-04-14 NOTE — TELEPHONE ENCOUNTER
Trae,    Thank you for your message back. I do not believe that the kidney disease would be linked to these symptoms of increased urinary frequency or unexpected weight loss.  The kidney injury that was seen was very minimal I do not expect that you should see any symptoms from it at all. Please keep a weight log by weighing yourself once a week for the next month so we can monitor this.     Regards,    Bill Miller MD

## 2025-04-14 NOTE — TELEPHONE ENCOUNTER
Hello -     Here are my comments about the recent results.  Your labs are normal except for a slightly elevated creatinine level.  This indicates potentially mild kidney disease.  I would recommend that we repeat this test in 3 months, and if it still is elevated at that time I would recommend for further workup to find out why.  In the meantime make sure that you are drinking plenty of water, avoid medicines that could be harmful to your kidneys like ibuprofen/Motrin/Advil, as well as Aleve/naproxen.     I will place the lab order for you.  Please make a lab appointment in 3 months to have that done, thank you     Please let us know if you have any questions or concerns.     Regards,  Bill Miller MD

## 2025-04-23 ENCOUNTER — MYC MEDICAL ADVICE (OUTPATIENT)
Dept: FAMILY MEDICINE | Facility: CLINIC | Age: 25
End: 2025-04-23
Payer: COMMERCIAL

## 2025-04-23 DIAGNOSIS — F32.2 CURRENT SEVERE EPISODE OF MAJOR DEPRESSIVE DISORDER WITHOUT PSYCHOTIC FEATURES WITHOUT PRIOR EPISODE (H): ICD-10-CM

## 2025-04-23 RX ORDER — BUPROPION HYDROCHLORIDE 300 MG/1
300 TABLET ORAL EVERY MORNING
Qty: 90 TABLET | Refills: 3 | Status: SHIPPED | OUTPATIENT
Start: 2025-04-23 | End: 2025-04-24

## 2025-04-23 RX ORDER — BUPROPION HYDROCHLORIDE 150 MG/1
150 TABLET ORAL EVERY MORNING
Qty: 90 TABLET | Refills: 3 | Status: SHIPPED | OUTPATIENT
Start: 2025-04-23 | End: 2025-04-24

## 2025-04-23 NOTE — TELEPHONE ENCOUNTER
Routing to Dr. Miller- Do you want us to submit a PA I guess for the 450mg dose? I am assuming they might need a letter as well. Or do you want to order a combo of the 150 and 300?     Anju PERAZA RN

## 2025-04-23 NOTE — TELEPHONE ENCOUNTER
Spoke with pharmacy at [facility] location. They said patient is trying to refill at a location the RX was not sent. Patient needs to call them to request RX be moved over.

## 2025-04-23 NOTE — TELEPHONE ENCOUNTER
I would recommend that we try to split up the dose, into a 300 and 150  mg tablet, which should be taken together for a total of 450 mg.  It looks like the cheapest way for him to get these is through a mail order pharmacy, for a 3-month prescription it looks like it should be less than $50.  I have put the orders into the mail order pharmacy, but can you please notify the patient I have made the change.

## 2025-04-24 RX ORDER — BUPROPION HYDROCHLORIDE 300 MG/1
300 TABLET ORAL EVERY MORNING
Qty: 90 TABLET | Refills: 3 | Status: SHIPPED | OUTPATIENT
Start: 2025-04-24

## 2025-04-24 RX ORDER — BUPROPION HYDROCHLORIDE 150 MG/1
150 TABLET ORAL EVERY MORNING
Qty: 90 TABLET | Refills: 3 | Status: SHIPPED | OUTPATIENT
Start: 2025-04-24

## 2025-04-24 NOTE — TELEPHONE ENCOUNTER
It looks like the new Rxs were accidentally send to [facility]. Once patient returns message with agreement to mail order we will resend those to [facility] mail order

## 2025-04-30 DIAGNOSIS — F41.9 ANXIETY AND DEPRESSION: ICD-10-CM

## 2025-04-30 DIAGNOSIS — F32.A ANXIETY AND DEPRESSION: ICD-10-CM

## 2025-04-30 RX ORDER — VENLAFAXINE 37.5 MG/1
37.5 TABLET ORAL DAILY
Qty: 90 TABLET | Refills: 1 | Status: SHIPPED | OUTPATIENT
Start: 2025-04-30

## 2025-05-20 ENCOUNTER — MYC MEDICAL ADVICE (OUTPATIENT)
Dept: FAMILY MEDICINE | Facility: CLINIC | Age: 25
End: 2025-05-20
Payer: COMMERCIAL

## 2025-05-20 ENCOUNTER — ORDERS ONLY (AUTO-RELEASED) (OUTPATIENT)
Dept: FAMILY MEDICINE | Facility: CLINIC | Age: 25
End: 2025-05-20

## 2025-05-20 ENCOUNTER — E-VISIT (OUTPATIENT)
Dept: FAMILY MEDICINE | Facility: CLINIC | Age: 25
End: 2025-05-20
Payer: COMMERCIAL

## 2025-05-20 DIAGNOSIS — F51.01 PRIMARY INSOMNIA: Primary | ICD-10-CM

## 2025-05-20 DIAGNOSIS — R00.0 TACHYCARDIA: ICD-10-CM

## 2025-05-20 RX ORDER — ZOLPIDEM TARTRATE 6.25 MG/1
6.25 TABLET, FILM COATED, EXTENDED RELEASE ORAL
Qty: 30 TABLET | Refills: 0 | Status: SHIPPED | OUTPATIENT
Start: 2025-05-20

## 2025-05-20 NOTE — PATIENT INSTRUCTIONS
Thank you for checking in. I have adjusted your medication to manage your symptoms. The following medication was sent to your pharmacy:    Orders Placed This Encounter   Medications     zolpidem ER (AMBIEN CR) 6.25 MG CR tablet     Sig: Take 1 tablet (6.25 mg) by mouth nightly as needed for sleep.     Dispense:  30 tablet     Refill:  0        Regarding the fast heart rate, I would recommend that you come in and do an EKG, I placed an order for you to do so.  Please make an appointment with one of the nurses to have this done in the clinic.  You can do that by calling [phone].    I have also placed an order for a Zio patch, this is a portable heart monitor that you wear for 1 week.  You will get it in the mail.  Please follow the instructions that come with it and then send it back after 1 week.    There is no further lab work to get, I did get a blood count and thyroid test at her last visit as well as metabolic panel and these were all normal.    View your full visit summary for details by clicking on the link below. Your pharmacist will be able to address any questions you may have about the medication.       Please send an eVisit to follow up on this medication change in 4 weeks, or sooner if needed.     Thank you for choosing us for your care.

## 2025-05-28 ASSESSMENT — PATIENT HEALTH QUESTIONNAIRE - PHQ9
SUM OF ALL RESPONSES TO PHQ QUESTIONS 1-9: 12
10. IF YOU CHECKED OFF ANY PROBLEMS, HOW DIFFICULT HAVE THESE PROBLEMS MADE IT FOR YOU TO DO YOUR WORK, TAKE CARE OF THINGS AT HOME, OR GET ALONG WITH OTHER PEOPLE: VERY DIFFICULT
SUM OF ALL RESPONSES TO PHQ QUESTIONS 1-9: 12

## 2025-05-29 ENCOUNTER — FCC EXTENDED DOCUMENTATION (OUTPATIENT)
Dept: PSYCHOLOGY | Facility: CLINIC | Age: 25
End: 2025-05-29
Payer: COMMERCIAL

## 2025-05-29 ENCOUNTER — VIRTUAL VISIT (OUTPATIENT)
Dept: PSYCHOLOGY | Facility: CLINIC | Age: 25
End: 2025-05-29
Attending: STUDENT IN AN ORGANIZED HEALTH CARE EDUCATION/TRAINING PROGRAM
Payer: COMMERCIAL

## 2025-05-29 DIAGNOSIS — R41.840 INATTENTION: Primary | ICD-10-CM

## 2025-05-29 NOTE — Clinical Note
Hello,  I saw this patient for an ADHD assessment. I will forward you my report and findings upon completion. Please let me know if you have any questions or concerns.  Reanna Christopher, PhD,  Clinical Psychologist

## 2025-05-29 NOTE — PROGRESS NOTES
[facility]   Mental Health & Addiction Services     Progress Note - Initial Visit    Patient  Name:  Trae Gilmore Date: 2025         Service Type: Individual     Visit Start Time: 11:01am  Visit End Time: 11:56am    Visit #: 1    Attendees: Client attended alone    Service Modality:  Video Visit:      Provider verified identity through the following two step process.  Patient provided:  Patient  and Patient address    Telemedicine Visit: The patient's condition can be safely assessed and treated via synchronous audio and visual telemedicine encounter.      Reason for Telemedicine Visit: Patient has requested telehealth visit    Originating Site (Patient Location): Patient's home    Distant Site (Provider Location): [facility]    Consent:  The patient/guardian has verbally consented to: the potential risks and benefits of telemedicine (video visit) versus in person care; bill my insurance or make self-payment for services provided; and responsibility for payment of non-covered services.     Patient would like the video invitation sent by:  My Chart    Mode of Communication:  Video Conference via Amwell    Distant Location (Provider):  Off-site    As the provider I attest to compliance with applicable laws and regulations related to telemedicine.       DATA:   Interactive Complexity: No   Crisis: No  Extended Session (53+ minutes): PROLONGED SERVICE IN THE OUTPATIENT SETTING REQUIRING DIRECT (FACE-TO-FACE) PATIENT CONTACT BEYOND THE USUAL SERVICE:    - Patient's presenting concerns require more intensive intervention than could be completed within the usual service     Presenting Concerns/  Current Stressors:   Began DA for ADHD assessment.      ASSESSMENT:  Mental Status Assessment:  Appearance:   Appropriate   Eye Contact:   Good   Psychomotor Behavior: Normal   Attitude:   Cooperative  Interested Friendly Pleasant  Orientation:   All  Speech   Rate /  Production: Normal/ Responsive   Volume:  Normal   Mood:    Normal  Affect:    Appropriate   Thought Content:  Clear   Thought Form:  Coherent  Logical   Insight:    Good     Assessments completed prior to this visit:  The following assessments were completed by patient for this visit:  PHQ9:       8/1/2024     7:04 AM 3/18/2025     2:08 PM 5/28/2025    12:40 PM   PHQ-9 SCORE   PHQ-9 Total Score MyChart 21 (Severe depression) 12 (Moderate depression) 12 (Moderate depression)   PHQ-9 Total Score 21 12  12        Patient-reported     GAD7:       4/7/2025     3:04 PM   NAJMA-7 SCORE   Total Score 8 (mild anxiety)   Total Score 8        Patient-reported     CAGE-AID:       5/28/2025    12:52 PM   CAGE-AID Total Score   Total Score 2    Total Score MyChart 2 (A total score of 2 or greater is considered clinically significant)       Patient-reported     PROMIS 10-Global Health (all questions and answers displayed):       5/28/2025    12:52 PM   PROMIS 10   In general, would you say your health is: Good   In general, would you say your quality of life is: Very good   In general, how would you rate your physical health? Good   In general, how would you rate your mental health, including your mood and your ability to think? Fair   In general, how would you rate your satisfaction with your social activities and relationships? Very good   In general, please rate how well you carry out your usual social activities and roles Very good   To what extent are you able to carry out your everyday physical activities such as walking, climbing stairs, carrying groceries, or moving a chair? Completely   In the past 7 days, how often have you been bothered by emotional problems such as feeling anxious, depressed, or irritable? Often   In the past 7 days, how would you rate your fatigue on average? Severe   In the past 7 days, how would you rate your pain on average, where 0 means no pain, and 10 means worst imaginable pain? 0   In general,  would you say your health is: 3   In general, would you say your quality of life is: 4   In general, how would you rate your physical health? 3   In general, how would you rate your mental health, including your mood and your ability to think? 2   In general, how would you rate your satisfaction with your social activities and relationships? 4   In general, please rate how well you carry out your usual social activities and roles. (This includes activities at home, at work and in your community, and responsibilities as a parent, child, spouse, employee, friend, etc.) 4   To what extent are you able to carry out your everyday physical activities such as walking, climbing stairs, carrying groceries, or moving a chair? 5   In the past 7 days, how often have you been bothered by emotional problems such as feeling anxious, depressed, or irritable? 4   In the past 7 days, how would you rate your fatigue on average? 4   In the past 7 days, how would you rate your pain on average, where 0 means no pain, and 10 means worst imaginable pain? 0   Global Mental Health Score 12    Global Physical Health Score 15    PROMIS TOTAL - SUBSCORES 27        Patient-reported         Safety Issues and Plan for Safety and Risk Management:   [place] Suicide Severity Rating Scale (Lifetime/Recent)      5/29/2025    11:34 AM   [place] Suicide Severity Rating (Lifetime/Recent)   1. Wish to be Dead (Lifetime) N   1. Wish to be Dead (Past 1 Month) N   2. Non-Specific Active Suicidal Thoughts (Lifetime) N   Actual Attempt (Lifetime) N   Has subject engaged in non-suicidal self-injurious behavior? (Lifetime) N   Interrupted Attempts (Lifetime) N   Aborted or Self-Interrupted Attempt (Lifetime) N   Preparatory Acts or Behavior (Lifetime) N   Calculated C-SSRS Risk Score (Lifetime/Recent) No Risk Indicated     Patient denies current fears or concerns for personal safety.  Patient denies current or recent suicidal ideation or behaviors.  Patient  denies current or recent homicidal ideation or behaviors.  Patient denies current or recent self injurious behavior or ideation.  Patient denies other safety concerns.  Recommended that patient call 911 or go to the local ED should there be a change in any of these risk factors     DSM5 Diagnoses:   Diagnoses: R41.840 Inattention  Psychosocial & Contextual Factors: None  Intervention:   CBT: positive reinforcement, behavior modification  Emotion Focused Therapy: emotion checking  Motivational Interviewing: open ended questions  Collateral Reports Completed:  Routed note to PCP      PLAN: (Homework, other):  1. Provider will continue Diagnostic Assessment.  Patient was given the following to do until next session:  ADHD Testing:  Patient was given self and collaborative rating scales to be completed prior to the next appointment.  Depression and anxiety rating scales were completed.  Copies of no records were requested.     2. Provider recommended the following referrals: None.      3.  Suicide Risk and Safety Concerns were assessed for Trae Gilmore.    Patient meets the following risk assessment and triage: Patient denied any current/recent/lifetime history of suicidal ideation and/or behaviors.  No safety plan indicated at this time.       Reanna Christopher, PhD  May 29, 2025

## 2025-06-03 ENCOUNTER — ALLIED HEALTH/NURSE VISIT (OUTPATIENT)
Dept: FAMILY MEDICINE | Facility: CLINIC | Age: 25
End: 2025-06-03
Payer: COMMERCIAL

## 2025-06-03 ENCOUNTER — RESULTS FOLLOW-UP (OUTPATIENT)
Dept: FAMILY MEDICINE | Facility: CLINIC | Age: 25
End: 2025-06-03

## 2025-06-03 DIAGNOSIS — R00.0 TACHYCARDIA: Primary | ICD-10-CM

## 2025-06-03 LAB
ATRIAL RATE - MUSE: 100 BPM
DIASTOLIC BLOOD PRESSURE - MUSE: NORMAL MMHG
INTERPRETATION ECG - MUSE: NORMAL
P AXIS - MUSE: 62 DEGREES
PR INTERVAL - MUSE: 116 MS
QRS DURATION - MUSE: 88 MS
QT - MUSE: 348 MS
QTC - MUSE: 448 MS
R AXIS - MUSE: 85 DEGREES
SYSTOLIC BLOOD PRESSURE - MUSE: NORMAL MMHG
T AXIS - MUSE: 63 DEGREES
VENTRICULAR RATE- MUSE: 100 BPM

## 2025-06-03 PROCEDURE — 99207 PR NO CHARGE NURSE ONLY: CPT

## 2025-06-03 PROCEDURE — 93005 ELECTROCARDIOGRAM TRACING: CPT

## 2025-06-03 PROCEDURE — 93010 ELECTROCARDIOGRAM REPORT: CPT | Performed by: STUDENT IN AN ORGANIZED HEALTH CARE EDUCATION/TRAINING PROGRAM

## 2025-06-04 ENCOUNTER — ORDERS ONLY (AUTO-RELEASED) (OUTPATIENT)
Dept: FAMILY MEDICINE | Facility: CLINIC | Age: 25
End: 2025-06-04
Payer: COMMERCIAL

## 2025-06-04 DIAGNOSIS — R00.0 TACHYCARDIA: ICD-10-CM

## 2025-06-04 ASSESSMENT — SLEEP AND FATIGUE QUESTIONNAIRES
HOW LIKELY ARE YOU TO NOD OFF OR FALL ASLEEP WHILE SITTING INACTIVE IN A PUBLIC PLACE: WOULD NEVER DOZE
HOW LIKELY ARE YOU TO NOD OFF OR FALL ASLEEP WHILE LYING DOWN TO REST IN THE AFTERNOON WHEN CIRCUMSTANCES PERMIT: WOULD NEVER DOZE
HOW LIKELY ARE YOU TO NOD OFF OR FALL ASLEEP WHEN YOU ARE A PASSENGER IN A CAR FOR AN HOUR WITHOUT A BREAK: WOULD NEVER DOZE
HOW LIKELY ARE YOU TO NOD OFF OR FALL ASLEEP WHILE SITTING AND TALKING TO SOMEONE: WOULD NEVER DOZE
HOW LIKELY ARE YOU TO NOD OFF OR FALL ASLEEP IN A CAR, WHILE STOPPED FOR A FEW MINUTES IN TRAFFIC: WOULD NEVER DOZE
HOW LIKELY ARE YOU TO NOD OFF OR FALL ASLEEP WHILE SITTING QUIETLY AFTER LUNCH WITHOUT ALCOHOL: WOULD NEVER DOZE
HOW LIKELY ARE YOU TO NOD OFF OR FALL ASLEEP WHILE SITTING AND READING: WOULD NEVER DOZE
HOW LIKELY ARE YOU TO NOD OFF OR FALL ASLEEP WHILE WATCHING TV: WOULD NEVER DOZE

## 2025-06-05 ENCOUNTER — VIRTUAL VISIT (OUTPATIENT)
Dept: PSYCHOLOGY | Facility: CLINIC | Age: 25
End: 2025-06-05
Payer: COMMERCIAL

## 2025-06-05 DIAGNOSIS — R41.840 INATTENTION: ICD-10-CM

## 2025-06-05 DIAGNOSIS — F32.0 MDD (MAJOR DEPRESSIVE DISORDER), SINGLE EPISODE, MILD: Primary | ICD-10-CM

## 2025-06-05 NOTE — PROGRESS NOTES
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 _.  3. Other Diagnoses that is relevant to services:  R41.840 Inattention.  4. Provisional Diagnosis:  300.01 (F41.0) Panic Disorder as evidenced by monthly panic attacks.  5. Prognosis: Expect Improvement.  6. Likely consequences of symptoms if not treated: No improvement or worsening of symptoms.  7. Patient strengths include:  educated, empathetic, employed, has a previous history of therapy, insightful, intelligent, motivated, open to learning, support of family, friends and providers, and work history.     Recommendations:     1. Plan for Safety and Risk Management:   Safety and Risk: Recommended that patient call 911 or go to the local ED should there be a change in any of these risk factors        Report to child / adult protection services was NA.     2. Patient's identified No concerns with cultural influence to be addressed in treatment.     3. Initial Treatment will focus on:    ADHD Testing:  Patient was given psychological and cognitive testing to be completed prior to the next appointment.  Depression and anxiety rating scales, and self and collaborative rating scales were completed.  Copies of no records were requested.      4. Resources/Service  Plan:    services are not indicated.   Modifications to assist communication are not indicated.   Additional disability accommodations are not indicated.      5. Collaboration:   Collaboration / coordination of treatment will be initiated with the following  support professionals: primary care physician.      6.  Referrals:   The following referral(s) will be initiated: None.       A Release of Information has been obtained for the following: None.     Clinical Substantiation/medical necessity for the above recommendations:  Significant symptoms of ADHD.    7. EARLINE:    EARLINE:  Discussed the general effects of drugs and alcohol on health and well-being. Patient will abstain from cannabis used for a minimum of 2 weeks prior to cognitive testing.     8. Records:   These were reviewed at time of assessment.   Information in this assessment was obtained from the medical record and  provided by patient who is a good historian. Patient will have open access to their mental health medical record.    9.   Interactive Complexity: No    10. Safety Plan: No Safety plan indicated    Provider Name/ Credentials:  Reanna Christopher, PhD LP  June 5, 2025

## 2025-06-16 ENCOUNTER — RESULTS FOLLOW-UP (OUTPATIENT)
Dept: FAMILY MEDICINE | Facility: CLINIC | Age: 25
End: 2025-06-16

## 2025-06-16 LAB — CV ZIO PRELIM RESULTS: NORMAL

## 2025-06-18 NOTE — PROGRESS NOTES
[facility]    Psychological Report of ADHD Evaluation    PATIENT'S NAME: Trae Gilmore  MRN: [id number]  [facility]  [id number]  DATE OF SERVICE: 6/19/25  SERVICE MODALITY:  Video Visit:      Date(s) of assessment:   Diagnostic Assessment 5/29/2025, 6/5/2025  Mo self-report and collateral measures scored and interpreted 6/18/2025  MMPI 6/17/2025  CNS 6/17/2025    Information about appointment:  Patient attended two  sessions to aid in determining patient's mental health diagnosis or diagnoses and treatment recommendations that best address patient concerns. Patient records including medical were reviewed. A diagnostic assessment was conducted at the initial appointment. Patient completed several rating scales to assist in assessing attention-related and other mental health symptoms that may be causing impairments in functioning. Rating scales were also completed by a collateral contact.    Assessment tools:   Some measures may have been completed remotely due to virtual care, in which case observation of task completion was not possible.    Mo Adult ADHD Rating Scale-IV: Self and Other Reports (BAARS-IV), Mo Functional Impairment Scale: Self and Other Reports (BFIS), Mo Deficits in Executive Functioning Scale: Self and Other Reports (BDEFS), Patient Health Questionnaire-9 (PHQ-9), Generalized Anxiety Disorder-7 (NAJMA-7), [place] Multiphasic Personality Inventory (MMPI), CNS Vital Signs Neurocognitive Battery, and [place] Sleepiness Scale.    Assessment Results:    Mo Adult ADHD Rating Scale-IV: Self and Other Reports (BAARS-IV)  The BAARS-IV assesses for symptoms of ADHD that are experienced in one's daily life. This assessment measure includes self and collateral rating scales designed to provide information regarding current and childhood symptoms of ADHD including inattention, hyperactivity, and impulsivity. Self-report scores are reported as percentiles. Scores at  "the 76th-83rd percentile are considered marginal, scores at the 84th-92nd percentile are considered borderline, scores at the 93rd-95th percentile are considered mild, scores at the 96th-98th percentile are considered moderate, and those at the 99th percentile are considered severe. Collateral or \"other\" rating scales are reported as number of symptoms observed in comparison to those reported by the patient. Norms and percentile scores are not available for collateral reports.     Current Symptoms Scale--Self Report:   Patient completed the self-report inventory of current symptoms. The results indicate that the patient's Total ADHD Score was 56 which places him in the 99+ percentile for overall ADHD symptoms. In addition, the patient endorsed 7/9 (98th percentile) Inattention symptoms, 6/9 (98th percentile) Hyperactivity-Impulsivity symptoms, and 5/9 (94th percentile) Sluggish Cognitive Tempo symptoms. Patient indicated that the reported symptoms have resulted in impaired functioning in school, home, and social relationships. Overall, the results suggest the patient is expeiencing Moderate ADHD symptoms.     Current Symptoms Scale--Other Report:  Patient's brother and sister-in-law completed the collateral report inventory of current symptoms. Based on the collateral contact's observation of symptoms, the patient demonstrates 6/9 Inattention symptoms, 6/9 Hyperactivity-Impulsivity symptoms, and 3/9 Sluggish Cognitive Tempo symptoms. The patient's Total ADHD Score was 54. The collateral contact indicated the patient demonstrates impaired functioning in school, home, work, and social relationships. The collateral- and self-report scores are not significantly different.     Childhood Symptoms Scale--Self-Report:  Patient completed the self-report inventory of childhood symptoms. The results indicate that the patient's Total ADHD Score was 39 which places him in the 87th percentile for overall ADHD symptoms in " "childhood. In addition, the patient endorsed 2/9 (83rd percentile) Inattention symptoms and  4/9 (89th percentile) Hyperactivity-Impulsivity symptoms. Patient indicated that the reported symptoms resulted in impaired functioning in school, home, and social relationships. Overall, the results suggest the patient experienced  Borderline symptoms of ADHD as a child.     Childhood Symptoms Scale--Other Report:  Patient's parents completed the collateral report inventory of childhood symptoms. Based on the collateral contact's recollection of patient's childhood symptoms, the patient demonstrated 1/9 Inattention symptoms and 2/9 Hyperactivity-Impulsivity symptoms. The patient's Total ADHD Score was 28. The collateral contact indicated the patient demonstrates impaired functioning in no areas.The collateral- and self-report scores are significantly different for symptoms of hyperactivity-impulsivity, the parents did not report any significant symptoms whereas the patient reported mild symptoms.                          Mo Functional Impairment Scale: Self and Other Reports (BFIS)  The BFIS is used to assess the level of impairment in major life/daily activities that may be due to mental health symptoms. This assessment measure includes self and collateral rating scales. Self-report scores are reported as percentiles. Scores at the 76th-83rd percentile are considered marginal, scores at the 84th-92nd percentile are considered borderline, scores at the 93rd-95th percentile are considered mild, scores at the 96th-98th percentile are considered moderate, and those at the 99th percentile are considered severe.Collateral or \"other\" rating scales are reported as number of symptoms observed in comparison to those reported by the patient. Norms and percentile scores are not available for collateral reports.     Results indicate the patient identified impairment (scores at or greater than 93rd percentile) in the following " "areas: home-chores, social-strangers, money management, and self-care routines The patient's Mean Impairment Score was 4.3 (76-84th percentile) indicating the patient is reporting Marginal impairment in functioning across domains. Patient's brother and sister-in-law completed the collateral rating scale, which indicated similar results. The collateral contact's scores were generally the same as the patient's report.     Mo Deficits in Executive Functioning Scale (BDEFS)  The BDEFS is a measure used for evaluating adult executive functioning in daily activities.This assessment measure includes self and collateral rating scales. Self-report scores are reported as percentiles. Scores at the 76th-83rd percentile are considered marginal, scores at the 84th-92nd percentile are considered borderline, scores at the 93rd-95th percentile are considered mild, scores at the 96th-98th percentile are considered moderate, and those at the 99th percentile are considered severe.Collateral or \"other\" rating scales are reported as number of symptoms observed in comparison to those reported by the patient. Norms and percentile scores are not available for collateral reports.     Results indicate the patient's Total Executive Functioning Score was 232 (97th percentile). The ADHD-Executive Functioning Index score was 29 (96th percentile). These scores suggest the patient has Moderate deficits in executive functioning. These deficits are likely to be due to ADHD. Results indicate the patient identified significant deficits in the following areas: self-management to time (98th percentile) , self-organization/problem-solving (97th percentile),  self-restraint (93rd percentile), and self-motivation (99+ percentile). Patient's brother and sister-in-law completed the collateral rating scale, which indicated similar results. The collateral contact's scores were generally lower than the patient's report.    CNS Vital Signs Neurocognitive " Battery  The CNS Vital Signs Neurocognitive Battery is a remotely-administered assessment comprised of seven core subtests to individually measure the patient's verbal memory, visual memory, motor speed, psychomotor speed, reaction time, focus, ability to sustain attention and ability to adapt to changing rules and tasks.      Above average domain scores indicate a standard score (SS) greater than 109 or a Percentile Rank (NC) greater than 74, indicating a high functioning test subject. Average is a SS  or NC 25-74, indicating normal function. Low Average is a SS 80-89 or NC 9-24 indicating a slight deficit or impairment. Below Average is a SS 70-79 or NC 2-8, indicating a moderate level of deficit or impairment. Very Low is a SS less than 70 or a NC less than 2, indicating a deficit and impairment.  Validity Indicator denotes a guideline for representing the possibility of an invalid test or domain score, and can be influenced by patient understanding, effort, or other conditions.    The patient's results are detailed below:    Domain Standard Score Percentile Description Validity   Neurocognitive Index 114 82 Above Yes   Composite Memory Measure 101 53 Average Yes   Verbal Memory 108 70 Average Yes   Visual Memory 96 40 Average Yes   Psychomotor Speed 135 99 Above Yes   Reaction Time 116 86 Above Yes   *Complex Attention 101 53 Average Yes   *Cognitive Flexibility 117 87 Above Yes   *Processing Speed 143 99 Above Yes   *Executive Function 119 90 Above Yes   Reasoning 113 81 Above Yes   Working Memory 119 90 Above Yes   Sustained Attention  115 84 Above Yes   *Simple Attention 108 70 Average Yes   Motor Speed 117 87 Above Yes   *Scales most indicative of ADHD    Neurocognitive Index (NCI): Measures an average score derived from the domain scores or a general assessment of the overall neurocognitive status of the patient. The patient's NCI score is 114, with a percentile of 82, and falls within the Above  range.    Composite Memory: Measures how well subject can recognize, remember, and retrieve words and geometric figures, and is comprised of the Visual and Verbal Memory domains. The patient's Composite Memory score is 101, with a percentile of 53, and falls within the Average range.    Verbal Memory: Measures how well subject can recognize, remember, and retrieve words. The patient's Verbal Memory score is 108, with a percentile of 70, and falls within the Average range.    Visual Memory: Measures how well subject can recognize, remember and retrieve geometric figures. The patient's Visual Memory score is 96, with a percentile of 40, and falls within the Average range.    Psychomotor Speed: Measures how well a subject perceives, attends, responds to complex visual-perceptual information and performs simple fine motor coordination, and is comprised of the Motor Speed and Processing Speed indexes. The patient's Psychomotor Speed score is 135, with a percentile of 99, and falls within the Above range.    Reaction Time: Measures how quickly the subject can react, in milliseconds, to a simple and increasingly complex direction set. The patient's Reaction Time score is 116, with a percentile of 86, and falls within the Above range.    Complex Attention: Measures the ability to track and respond to a variety of stimuli over lengthy periods of time and/or perform complex mental tasks requiring vigilance quickly and accurately. The patient's Complex Attention score is 101, with a percentile of 53, and falls within the Average range.    Cognitive Flexibility: Measures how well subject is able to adapt to rapidly changing and increasingly complex set of directions and/or to manipulate the information. The patient's Cognitive Flexibility score is 117, with a percentile of 87, and falls within the Above range.    Processing Speed: Measures how well a subject recognizes and processes information i.e., perceiving,  attending/responding to incoming information, motor speed, fine motor coordination, and visual-perceptual ability. The patient's Processing Speed score is 143, with a percentile of 99, and falls within the Above range.    Executive Function: Measures how well a subject recognizes rules, categories, and manages or navigates rapid decision making. The patient's Executive Function score is 119, with a percentile of 90, and falls within the Above range.    Reasoning: Measures how well a subject can perceive and understand the meaning of visual or abstract information and recognizing relationships between visual-abstract concepts. The patient's Reasoning score is 113, with a percentile of 81, and falls in the Above range.     Working Memory: Measures how well a subject can perceive and attend to symbols using short-term memory processes. Also measures the ability to carry out short-term memory tasks that support decision making, problem solving, planning, and execution. The patient's Working Memory score is 119, with a percentile of 90, and falls in the Above range.    Sustained Attention: Measures how well a subject can direct and focus cognitive activity on specific stimuli. Also measurs how well a subject can focus and complete task or activity, sequence action, and focus during complex thought. The patient's Sustained Attention score is 115, with a percentile of 84, and falls in the Above range.    Simple Attention: Measures the ability to track and respond to a single defined stimulus over lengthy periods of time while performing vigilance and response inhibition quickly and accurately to a simple task. The patient's Simple Attention score is 108, with a percentile of 70, and falls within the Average range.    Motor Speed: Measure: Ability to perform simple movements to produce and satisfy an intention towards a manual action and goal. The patient's Motor Speed score is 117, with a percentile of 87, and falls within  the Above range.    [place] Multiphasic Personality Inventory - 3 (MMPI-3)   The MMPI-3 was administered to evaluate current level of emotional distress. Validity profile indicates that the patient appears to have answered in a generally straightforward and consistent manner, and obtained results are considered to be reliable and valid in representing current psychological status. No items were omitted.      Emotional Dysfunction: Patient reports having self-doubt and futility. Patient reports being passive, inefficacious, and indecisive. They report multiple anxiety-related experiences, including generalized anxiety, re-experiencing, and/or panic.     Behavioral Dysfunction: Patient reports engaging in problematic impulsive behavior.     Somatic/Cognitive Dysfunction: Patient reports a general sense of malaise manifested in poor health and feeling tired, weak, and incapacitated. Patient reports vague neurological complaints. They report a diffuse pattern of cognitive difficulties including memory problems, difficulties with concentration and attention, and possible confusion.     Interpersonal Functioning: They report being passive and submissive, not liking to be in charge, and ready to give in to other. Patient reports being unassertive.     Thought Dysfunction: No significant findings.    Diagnostic Considerations: Evaluate for anxiety, impulse control, somatic symptom disorder, and attention-related disorders.    Treatment Considerations: Indecisiveness, impulsive behavior, and malaise may interfere with treatment. Patient is likely to reject psychological interpretations of neurological complaints. Treatment should focus on self-esteem, anxiety, impulse control, and passivity.    Generalized Anxiety Disorder Questionnaire (NAJMA-7)  This self-report questionnaire is designed to assess for anxiety in adults. Patient s score of 7 indicates that he is experiencing mild symptoms of anxiety.  Patient indicates  these symptoms have made it very difficult for them to do work, take care of things at home, or get along with other people.    Patient Health Questionnaire- 9 (PHQ-9)   This self-report questionnaire is designed to assess for depression in adults. Patient s score of 16 indicates that he is experiencing moderately severe symptoms of depression. Patient indicates these symptoms have made it extremely difficult for them to do work, take care of things at home, or get along with other people.    [place] Sleepiness Scale (ESS) SF-8  This self-report questionnaire is an eight-question self-assessment to determine how daytime sleepiness affects your ability to complete routine tasks. Patient's score of 0 indicates they are experiencing no daytime sleepiness.    Collateral Information   Collateral information was provided by patient's brother, sister-in-law, and parents.    Summary (based on clinical interview, review of records, test results):    Patient is a 24 year old,  individual.  Patient was referred for an assessment by primary care provider and psychologist (Vicki Ma) through [facility].  Patient attended the session alone. The purpose of this evaluation is to: evaluate current cognitive functioning, provide treatment recommendations, and clarify diagnosis. Patient reported seeking services at this time for diagnostic assessment and recommendations for treatment.  Patient reported that he has not completed a previous ADHD diagnostic assessment.  Patient has not received a previous diagnosis of ADHD. Patient reported that medication has not been prescribed medication to address these problems.     Patient presented a list of complaints otherwise would not remember everything. Complains of a combination of things as long as can remember. Not recongized sooner becaused did really well in school. Fidgiting, leg bouncing. Procrastinating things that want to do and having to remind self to do them. Poor sleep for  the past couple years, tried different medications and working with psychologist on this. Caffeine every day and if not cannot function. Poor inhibition, addictive personality, if have alcohol in home will drink it even if don't want it, if not there don't care to drink it. Big over thinker, chronic boredom. Massive people pleaser. Overstimulated very easily. Diagnosed with depression, have had a lot of anxiety including panic attacks (not formally diagnosed). Easily distracted. Forgetful, poor ST memory especially the past couple years. Lack of motivation, careless mistakes especially in writing, close out tab currently using. Need constant stimulation; if playing a video game need a show in the background. When listening to music will hyper fixate on a specific artist or song. Struggle with building routine, if thrown off routine for even a day will be very difficult to start again. Lack of patience. Finishing and predicting other people's sentences, interrupting to show listening. Can't keep a hobby, bounce from one thing to another. Frequent vocal stims like repeating a certain section of a song over and over, quote, video, etc Since a child.     Patient reported they grew up in [place], [place].  They were raised by biological parents.  Parents were always together, still both living. Relationship with both mom and dad is reported to be very close, see a couple times a month, talk a couple times a week. Older brother (32), relationship is also good, very close, just got back from a trip to [place] together, he's busy but hang out at least once a month, similar interests.  Patient reported that their childhood was good overall, no big things that regret, parents very supportive, had a good support system, saw extended family regularly, did well in school, has close friends. Patient described his childhood family environment as nurturing and stable.  As a child, patient reported that he failed to complete assigned chores in  "the home environment, had problems getting ready for school in the morning, had problems with organization and keeping track of items, misplaced or lost things, needed frequent reminders by parents to be motivated or to complete work, displayed argumentative or oppositional behaviors, and had difficulty managing personal hygiene. Patient reported no difficulty with childhood peer relationships. The patient describes their cultural background as .  Cultural influences and impact on patient's life structure, values, norms, and healthcare: Grew up in a strongly Evangelical household, dad on the [employer] board, attended [employer] school from K-8 and chose to go to a private [employer] school for high school.  Patient identified their preferred language to be English. Patient reported they do need the assistance of an  or other support involved in therapy.      Patient reported had no significant delays in developmental tasks.   Patient's highest education level was college graduate at [employer] in Dec 2022 (3 1/2 years) with a BS in neuroscience and a BA in psychology, came in with credits, did well; don't remember a lot of what learned in college. Patient graduated high school in 2019 with a 3.9 (4.2 weighted) GPA. Patient did not receive tutoring services during the school years. Patient did not receive special education services. During the elementary, middle, and high school years, patient recalls academic strengths in the area of reading, writing, math, language, science, music, \"hands on\" activities, test taking, and drama/theatre. Patient reported experiencing academic problems in physical education. Patient did identify the following learning problems: attention and concentration.  Patient reported significant behavior and discipline problems including: disruptive classroom behavior. Modifications will not be used to assist communication in therapy.  Patient reports they are able to " understand written materials.     Patient provided the following descriptions:   Homework: Really easy, completed it fine.  Transition to College: Difficult to learn to study, in a lot of classes that took time (science labs) and a lot of difficult subjects. Socially was fine.   Studying: Didn't need to focus on because homework was easy all the way through high school. In college had to learn how to study.  Attention: Pretty low, can focus at time in bursts, things come easy to learn so didn't have to pay attention for long periods. Long lectures were a struggle at times, easier for things more interested in, a lot of spacing out and talking to friends, easily distracted. Had to study more out of class due to not paying attention in class.   Attendance: No problems in high school, in college there were days didn't want to go to class (5-6 per semester).  Peers: No problems  Behavioral (suspended? Expelled?): No  Transferred schools?: No  Talkative in school?: Yes, very talkative in class, talked to friends, disruptive in class in elementary, middle, and high school.  Seating chart or moved in classroom?: Yes, especially in elementary and middle school, fairly frequent.  Difficulty with grocery lists?: Make lists, if don't make a list won't know what to get, everything will leave head if not written down, forget things if don't have a list.     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 Client reported that the current job is a good fit for his skills and personality for now, don't know what want to do with life. Client reported that he frequently made mistakes with poor attention to detail, disorganized behavior, distractible behavior, and problems learning new materials. Patient reports their  finances are obtained through employment; parents. 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  Client has not been terminated from a place of employment.  Patient does identify finances as a current stressor.       Patient reported having sleep disturbance, including: daytime drowsiness / fatigue and insomnia during childhood. Patient reported currently experiencing sleep disturbance, including: daytime drowsiness / fatigue, insomnia, snoring, and teeth grinding.  Client reported sleeping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

## 2025-06-19 ENCOUNTER — VIRTUAL VISIT (OUTPATIENT)
Dept: PSYCHOLOGY | Facility: CLINIC | Age: 25
End: 2025-06-19
Payer: COMMERCIAL

## 2025-06-19 ENCOUNTER — MYC MEDICAL ADVICE (OUTPATIENT)
Dept: FAMILY MEDICINE | Facility: CLINIC | Age: 25
End: 2025-06-19

## 2025-06-19 ENCOUNTER — DOCUMENTATION ONLY (OUTPATIENT)
Dept: PSYCHOLOGY | Facility: CLINIC | Age: 25
End: 2025-06-19
Payer: COMMERCIAL

## 2025-06-19 DIAGNOSIS — F90.2 ADHD (ATTENTION DEFICIT HYPERACTIVITY DISORDER), COMBINED TYPE: Primary | ICD-10-CM

## 2025-06-19 DIAGNOSIS — F32.0 MDD (MAJOR DEPRESSIVE DISORDER), SINGLE EPISODE, MILD: ICD-10-CM

## 2025-06-19 ASSESSMENT — SLEEP AND FATIGUE QUESTIONNAIRES
HOW LIKELY ARE YOU TO NOD OFF OR FALL ASLEEP WHILE WATCHING TV: WOULD NEVER DOZE
HOW LIKELY ARE YOU TO NOD OFF OR FALL ASLEEP WHILE SITTING INACTIVE IN A PUBLIC PLACE: WOULD NEVER DOZE
HOW LIKELY ARE YOU TO NOD OFF OR FALL ASLEEP WHILE SITTING QUIETLY AFTER LUNCH WITHOUT ALCOHOL: WOULD NEVER DOZE
HOW LIKELY ARE YOU TO NOD OFF OR FALL ASLEEP IN A CAR, WHILE STOPPED FOR A FEW MINUTES IN TRAFFIC: WOULD NEVER DOZE
HOW LIKELY ARE YOU TO NOD OFF OR FALL ASLEEP WHILE SITTING AND READING: WOULD NEVER DOZE
HOW LIKELY ARE YOU TO NOD OFF OR FALL ASLEEP WHILE LYING DOWN TO REST IN THE AFTERNOON WHEN CIRCUMSTANCES PERMIT: WOULD NEVER DOZE
HOW LIKELY ARE YOU TO NOD OFF OR FALL ASLEEP WHEN YOU ARE A PASSENGER IN A CAR FOR AN HOUR WITHOUT A BREAK: WOULD NEVER DOZE
HOW LIKELY ARE YOU TO NOD OFF OR FALL ASLEEP WHILE SITTING AND TALKING TO SOMEONE: WOULD NEVER DOZE

## 2025-06-19 NOTE — PROGRESS NOTES
[facility]    Psychological Report of ADHD Evaluation    PATIENT'S NAME: Trae Gilmore  MRN: [id number]  [facility]  [id number]  DATE OF SERVICE: 6/19/25  SERVICE MODALITY:  Video Visit:      Date(s) of assessment:   Diagnostic Assessment 5/29/2025, 6/5/2025  Mo self-report and collateral measures scored and interpreted 6/18/2025  MMPI 6/17/2025  CNS 6/17/2025    Information about appointment:  Patient attended two  sessions to aid in determining patient's mental health diagnosis or diagnoses and treatment recommendations that best address patient concerns. Patient records including medical were reviewed. A diagnostic assessment was conducted at the initial appointment. Patient completed several rating scales to assist in assessing attention-related and other mental health symptoms that may be causing impairments in functioning. Rating scales were also completed by a collateral contact.    Assessment tools:   Some measures may have been completed remotely due to virtual care, in which case observation of task completion was not possible.    [place] Adult ADHD Rating Scale-IV: Self and Other Reports (BAARS-IV), [place] Functional Impairment Scale: Self and Other Reports (BFIS), [place] Deficits in Executive Functioning Scale: Self and Other Reports (BDEFS), Patient Health Questionnaire-9 (PHQ-9), Generalized Anxiety Disorder-7 (NAJMA-7), [place] Multiphasic Personality Inventory (MMPI), CNS Vital Signs Neurocognitive Battery, and [place] Sleepiness Scale.    Assessment Results:    [place] Adult ADHD Rating Scale-IV: Self and Other Reports (BAARS-IV)  The BAARS-IV assesses for symptoms of ADHD that are experienced in one's daily life. This assessment measure includes self and collateral rating scales designed to provide information regarding current and childhood symptoms of ADHD including inattention, hyperactivity, and impulsivity. Self-report scores are reported as percentiles. Scores at  "the 76th-83rd percentile are considered marginal, scores at the 84th-92nd percentile are considered borderline, scores at the 93rd-95th percentile are considered mild, scores at the 96th-98th percentile are considered moderate, and those at the 99th percentile are considered severe. Collateral or \"other\" rating scales are reported as number of symptoms observed in comparison to those reported by the patient. Norms and percentile scores are not available for collateral reports.     Current Symptoms Scale--Self Report:   Patient completed the self-report inventory of current symptoms. The results indicate that the patient's Total ADHD Score was 56 which places him in the 99+ percentile for overall ADHD symptoms. In addition, the patient endorsed 7/9 (98th percentile) Inattention symptoms, 6/9 (98th percentile) Hyperactivity-Impulsivity symptoms, and 5/9 (94th percentile) Sluggish Cognitive Tempo symptoms. Patient indicated that the reported symptoms have resulted in impaired functioning in school, home, and social relationships. Overall, the results suggest the patient is expeiencing Moderate ADHD symptoms.     Current Symptoms Scale--Other Report:  Patient's brother and sister-in-law completed the collateral report inventory of current symptoms. Based on the collateral contact's observation of symptoms, the patient demonstrates 6/9 Inattention symptoms, 6/9 Hyperactivity-Impulsivity symptoms, and 3/9 Sluggish Cognitive Tempo symptoms. The patient's Total ADHD Score was 54. The collateral contact indicated the patient demonstrates impaired functioning in school, home, work, and social relationships. The collateral- and self-report scores are not significantly different.     Childhood Symptoms Scale--Self-Report:  Patient completed the self-report inventory of childhood symptoms. The results indicate that the patient's Total ADHD Score was 39 which places him in the 87th percentile for overall ADHD symptoms in " "childhood. In addition, the patient endorsed 2/9 (83rd percentile) Inattention symptoms and  4/9 (89th percentile) Hyperactivity-Impulsivity symptoms. Patient indicated that the reported symptoms resulted in impaired functioning in school, home, and social relationships. Overall, the results suggest the patient experienced  Borderline symptoms of ADHD as a child.     Childhood Symptoms Scale--Other Report:  Patient's parents completed the collateral report inventory of childhood symptoms. Based on the collateral contact's recollection of patient's childhood symptoms, the patient demonstrated 1/9 Inattention symptoms and 2/9 Hyperactivity-Impulsivity symptoms. The patient's Total ADHD Score was 28. The collateral contact indicated the patient demonstrates impaired functioning in no areas.The collateral- and self-report scores are significantly different for symptoms of hyperactivity-impulsivity, the parents did not report any significant symptoms whereas the patient reported mild symptoms.                          Mo Functional Impairment Scale: Self and Other Reports (BFIS)  The BFIS is used to assess the level of impairment in major life/daily activities that may be due to mental health symptoms. This assessment measure includes self and collateral rating scales. Self-report scores are reported as percentiles. Scores at the 76th-83rd percentile are considered marginal, scores at the 84th-92nd percentile are considered borderline, scores at the 93rd-95th percentile are considered mild, scores at the 96th-98th percentile are considered moderate, and those at the 99th percentile are considered severe.Collateral or \"other\" rating scales are reported as number of symptoms observed in comparison to those reported by the patient. Norms and percentile scores are not available for collateral reports.     Results indicate the patient identified impairment (scores at or greater than 93rd percentile) in the following " "areas: home-chores, social-strangers, money management, and self-care routines The patient's Mean Impairment Score was 4.3 (76-84th percentile) indicating the patient is reporting Marginal impairment in functioning across domains. Patient's brother and sister-in-law completed the collateral rating scale, which indicated similar results. The collateral contact's scores were generally the same as the patient's report.     Mo Deficits in Executive Functioning Scale (BDEFS)  The BDEFS is a measure used for evaluating adult executive functioning in daily activities.This assessment measure includes self and collateral rating scales. Self-report scores are reported as percentiles. Scores at the 76th-83rd percentile are considered marginal, scores at the 84th-92nd percentile are considered borderline, scores at the 93rd-95th percentile are considered mild, scores at the 96th-98th percentile are considered moderate, and those at the 99th percentile are considered severe.Collateral or \"other\" rating scales are reported as number of symptoms observed in comparison to those reported by the patient. Norms and percentile scores are not available for collateral reports.     Results indicate the patient's Total Executive Functioning Score was 232 (97th percentile). The ADHD-Executive Functioning Index score was 29 (96th percentile). These scores suggest the patient has Moderate deficits in executive functioning. These deficits are likely to be due to ADHD. Results indicate the patient identified significant deficits in the following areas: self-management to time (98th percentile) , self-organization/problem-solving (97th percentile),  self-restraint (93rd percentile), and self-motivation (99+ percentile). Patient's brother and sister-in-law completed the collateral rating scale, which indicated similar results. The collateral contact's scores were generally lower than the patient's report.    CNS Vital Signs Neurocognitive " Battery  The CNS Vital Signs Neurocognitive Battery is a remotely-administered assessment comprised of seven core subtests to individually measure the patient's verbal memory, visual memory, motor speed, psychomotor speed, reaction time, focus, ability to sustain attention and ability to adapt to changing rules and tasks.      Above average domain scores indicate a standard score (SS) greater than 109 or a Percentile Rank (HI) greater than 74, indicating a high functioning test subject. Average is a SS  or HI 25-74, indicating normal function. Low Average is a SS 80-89 or HI 9-24 indicating a slight deficit or impairment. Below Average is a SS 70-79 or HI 2-8, indicating a moderate level of deficit or impairment. Very Low is a SS less than 70 or a HI less than 2, indicating a deficit and impairment.  Validity Indicator denotes a guideline for representing the possibility of an invalid test or domain score, and can be influenced by patient understanding, effort, or other conditions.    The patient's results are detailed below:    Domain Standard Score Percentile Description Validity   Neurocognitive Index 114 82 Above Yes   Composite Memory Measure 101 53 Average Yes   Verbal Memory 108 70 Average Yes   Visual Memory 96 40 Average Yes   Psychomotor Speed 135 99 Above Yes   Reaction Time 116 86 Above Yes   *Complex Attention 101 53 Average Yes   *Cognitive Flexibility 117 87 Above Yes   *Processing Speed 143 99 Above Yes   *Executive Function 119 90 Above Yes   Reasoning 113 81 Above Yes   Working Memory 119 90 Above Yes   Sustained Attention  115 84 Above Yes   *Simple Attention 108 70 Average Yes   Motor Speed 117 87 Above Yes   *Scales most indicative of ADHD    Neurocognitive Index (NCI): Measures an average score derived from the domain scores or a general assessment of the overall neurocognitive status of the patient. The patient's NCI score is 114, with a percentile of 82, and falls within the Above  range.    Composite Memory: Measures how well subject can recognize, remember, and retrieve words and geometric figures, and is comprised of the Visual and Verbal Memory domains. The patient's Composite Memory score is 101, with a percentile of 53, and falls within the Average range.    Verbal Memory: Measures how well subject can recognize, remember, and retrieve words. The patient's Verbal Memory score is 108, with a percentile of 70, and falls within the Average range.    Visual Memory: Measures how well subject can recognize, remember and retrieve geometric figures. The patient's Visual Memory score is 96, with a percentile of 40, and falls within the Average range.    Psychomotor Speed: Measures how well a subject perceives, attends, responds to complex visual-perceptual information and performs simple fine motor coordination, and is comprised of the Motor Speed and Processing Speed indexes. The patient's Psychomotor Speed score is 135, with a percentile of 99, and falls within the Above range.    Reaction Time: Measures how quickly the subject can react, in milliseconds, to a simple and increasingly complex direction set. The patient's Reaction Time score is 116, with a percentile of 86, and falls within the Above range.    Complex Attention: Measures the ability to track and respond to a variety of stimuli over lengthy periods of time and/or perform complex mental tasks requiring vigilance quickly and accurately. The patient's Complex Attention score is 101, with a percentile of 53, and falls within the Average range.    Cognitive Flexibility: Measures how well subject is able to adapt to rapidly changing and increasingly complex set of directions and/or to manipulate the information. The patient's Cognitive Flexibility score is 117, with a percentile of 87, and falls within the Above range.    Processing Speed: Measures how well a subject recognizes and processes information i.e., perceiving,  attending/responding to incoming information, motor speed, fine motor coordination, and visual-perceptual ability. The patient's Processing Speed score is 143, with a percentile of 99, and falls within the Above range.    Executive Function: Measures how well a subject recognizes rules, categories, and manages or navigates rapid decision making. The patient's Executive Function score is 119, with a percentile of 90, and falls within the Above range.    Reasoning: Measures how well a subject can perceive and understand the meaning of visual or abstract information and recognizing relationships between visual-abstract concepts. The patient's Reasoning score is 113, with a percentile of 81, and falls in the Above range.     Working Memory: Measures how well a subject can perceive and attend to symbols using short-term memory processes. Also measures the ability to carry out short-term memory tasks that support decision making, problem solving, planning, and execution. The patient's Working Memory score is 119, with a percentile of 90, and falls in the Above range.    Sustained Attention: Measures how well a subject can direct and focus cognitive activity on specific stimuli. Also measurs how well a subject can focus and complete task or activity, sequence action, and focus during complex thought. The patient's Sustained Attention score is 115, with a percentile of 84, and falls in the Above range.    Simple Attention: Measures the ability to track and respond to a single defined stimulus over lengthy periods of time while performing vigilance and response inhibition quickly and accurately to a simple task. The patient's Simple Attention score is 108, with a percentile of 70, and falls within the Average range.    Motor Speed: Measure: Ability to perform simple movements to produce and satisfy an intention towards a manual action and goal. The patient's Motor Speed score is 117, with a percentile of 87, and falls within  the Above range.    [place] Multiphasic Personality Inventory - 3 (MMPI-3)   The MMPI-3 was administered to evaluate current level of emotional distress. Validity profile indicates that the patient appears to have answered in a generally straightforward and consistent manner, and obtained results are considered to be reliable and valid in representing current psychological status. No items were omitted.      Emotional Dysfunction: Patient reports having self-doubt and futility. Patient reports being passive, inefficacious, and indecisive. They report multiple anxiety-related experiences, including generalized anxiety, re-experiencing, and/or panic.     Behavioral Dysfunction: Patient reports engaging in problematic impulsive behavior.     Somatic/Cognitive Dysfunction: Patient reports a general sense of malaise manifested in poor health and feeling tired, weak, and incapacitated. Patient reports vague neurological complaints. They report a diffuse pattern of cognitive difficulties including memory problems, difficulties with concentration and attention, and possible confusion.     Interpersonal Functioning: They report being passive and submissive, not liking to be in charge, and ready to give in to other. Patient reports being unassertive.     Thought Dysfunction: No significant findings.    Diagnostic Considerations: Evaluate for anxiety, impulse control, somatic symptom disorder, and attention-related disorders.    Treatment Considerations: Indecisiveness, impulsive behavior, and malaise may interfere with treatment. Patient is likely to reject psychological interpretations of neurological complaints. Treatment should focus on self-esteem, anxiety, impulse control, and passivity.    Generalized Anxiety Disorder Questionnaire (NAJMA-7)  This self-report questionnaire is designed to assess for anxiety in adults. Patient s score of 7 indicates that he is experiencing mild symptoms of anxiety.  Patient indicates  these symptoms have made it very difficult for them to do work, take care of things at home, or get along with other people.    Patient Health Questionnaire- 9 (PHQ-9)   This self-report questionnaire is designed to assess for depression in adults. Patient s score of 16 indicates that he is experiencing moderately severe symptoms of depression. Patient indicates these symptoms have made it extremely difficult for them to do work, take care of things at home, or get along with other people.    [place] Sleepiness Scale (ESS) SF-8  This self-report questionnaire is an eight-question self-assessment to determine how daytime sleepiness affects your ability to complete routine tasks. Patient's score of 0 indicates they are experiencing no daytime sleepiness.    Collateral Information   Collateral information was provided by patient's brother, sister-in-law, and parents.    Summary (based on clinical interview, review of records, test results):    Patient is a 24 year old,  individual.  Patient was referred for an assessment by primary care provider and psychologist (Vicki Ma) through [facility].  Patient attended the session alone. The purpose of this evaluation is to: evaluate current cognitive functioning, provide treatment recommendations, and clarify diagnosis. Patient reported seeking services at this time for diagnostic assessment and recommendations for treatment.  Patient reported that he has not completed a previous ADHD diagnostic assessment.  Patient has not received a previous diagnosis of ADHD. Patient reported that medication has not been prescribed medication to address these problems.     Patient presented a list of complaints otherwise would not remember everything. Complains of a combination of things as long as can remember. Not recongized sooner becaused did really well in school. Fidgiting, leg bouncing. Procrastinating things that want to do and having to remind self to do them. Poor sleep for  the past couple years, tried different medications and working with psychologist on this. Caffeine every day and if not cannot function. Poor inhibition, addictive personality, if have alcohol in home will drink it even if don't want it, if not there don't care to drink it. Big over thinker, chronic boredom. Massive people pleaser. Overstimulated very easily. Diagnosed with depression, have had a lot of anxiety including panic attacks (not formally diagnosed). Easily distracted. Forgetful, poor ST memory especially the past couple years. Lack of motivation, careless mistakes especially in writing, close out tab currently using. Need constant stimulation; if playing a video game need a show in the background. When listening to music will hyper fixate on a specific artist or song. Struggle with building routine, if thrown off routine for even a day will be very difficult to start again. Lack of patience. Finishing and predicting other people's sentences, interrupting to show listening. Can't keep a hobby, bounce from one thing to another. Frequent vocal stims like repeating a certain section of a song over and over, quote, video, etc Since a child.     Patient reported they grew up in [place], [place].  They were raised by biological parents.  Parents were always together, still both living. Relationship with both mom and dad is reported to be very close, see a couple times a month, talk a couple times a week. Older brother (32), relationship is also good, very close, just got back from a trip to [place] together, he's busy but hang out at least once a month, similar interests.  Patient reported that their childhood was good overall, no big things that regret, parents very supportive, had a good support system, saw extended family regularly, did well in school, has close friends. Patient described his childhood family environment as nurturing and stable.  As a child, patient reported that he failed to complete assigned chores in  "the home environment, had problems getting ready for school in the morning, had problems with organization and keeping track of items, misplaced or lost things, needed frequent reminders by parents to be motivated or to complete work, displayed argumentative or oppositional behaviors, and had difficulty managing personal hygiene. Patient reported no difficulty with childhood peer relationships. The patient describes their cultural background as .  Cultural influences and impact on patient's life structure, values, norms, and healthcare: Grew up in a strongly Yarsanism household, dad on the Zoroastrianism board, attended [employer] school from K-8 and chose to go to a private [employer] school for high school.  Patient identified their preferred language to be English. Patient reported they do need the assistance of an  or other support involved in therapy.      Patient reported had no significant delays in developmental tasks.   Patient's highest education level was college graduate at [employer] in Dec 2022 (3 1/2 years) with a BS in neuroscience and a BA in psychology, came in with credits, did well; don't remember a lot of what learned in college. Patient graduated high school in 2019 with a 3.9 (4.2 weighted) GPA. Patient did not receive tutoring services during the school years. Patient did not receive special education services. During the elementary, middle, and high school years, patient recalls academic strengths in the area of reading, writing, math, language, science, music, \"hands on\" activities, test taking, and drama/theatre. Patient reported experiencing academic problems in physical education. Patient did identify the following learning problems: attention and concentration.  Patient reported significant behavior and discipline problems including: disruptive classroom behavior. Modifications will not be used to assist communication in therapy.  Patient reports they are able to " understand written materials.     Patient provided the following descriptions:   Homework: Really easy, completed it fine.  Transition to College: Difficult to learn to study, in a lot of classes that took time (science labs) and a lot of difficult subjects. Socially was fine.   Studying: Didn't need to focus on because homework was easy all the way through high school. In college had to learn how to study.  Attention: Pretty low, can focus at time in bursts, things come easy to learn so didn't have to pay attention for long periods. Long lectures were a struggle at times, easier for things more interested in, a lot of spacing out and talking to friends, easily distracted. Had to study more out of class due to not paying attention in class.   Attendance: No problems in high school, in college there were days didn't want to go to class (5-6 per semester).  Peers: No problems  Behavioral (suspended? Expelled?): No  Transferred schools?: No  Talkative in school?: Yes, very talkative in class, talked to friends, disruptive in class in elementary, middle, and high school.  Seating chart or moved in classroom?: Yes, especially in elementary and middle school, fairly frequent.  Difficulty with grocery lists?: Make lists, if don't make a list won't know what to get, everything will leave head if not written down, forget things if don't have a list.     Patient reported that they are currently employed full time at [employer] as a  for people with disabilities for 2 years; going really well, have had 2 different promotions, clients really like him and connect with him, supervisors really like him as well. Client reported that the current job is a good fit for his skills and personality for now, don't know what want to do with life. Client reported that he frequently made mistakes with poor attention to detail, disorganized behavior, distractible behavior, and problems learning new materials. Patient reports their  finances are obtained through employment; parents. 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  Client has not been terminated from a place of employment.  Patient does identify finances as a current stressor.       Patient reported having sleep disturbance, including: daytime drowsiness / fatigue and insomnia during childhood. Patient reported currently experiencing sleep disturbance, including: daytime drowsiness / fatigue, insomnia, snoring, and teeth grinding.  Client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

## 2025-06-19 NOTE — PROGRESS NOTES
Progress Note    Patient Name: Trae Gilmore  Date: 6/19/2025         Service Type: Individual for ADHD Feedback      Session Start Time: 10:00am  Session End Time: 10:19am     Session Length: 19min    Session #: 3 ADHD Feedback    Attendees: Client attended alone    Service Modality:  Video Visit:      Provider verified identity through the following two step process.  Patient provided:  Patient is known previously to provider    Telemedicine Visit: The patient's condition can be safely assessed and treated via synchronous audio and visual telemedicine encounter.      Reason for Telemedicine Visit: Patient has requested telehealth visit    Originating Site (Patient Location): Patient's home    Distant Site (Provider Location): [facility]    Consent:  The patient/guardian has verbally consented to: the potential risks and benefits of telemedicine (video visit) versus in person care; bill my insurance or make self-payment for services provided; and responsibility for payment of non-covered services.     Patient would like the video invitation sent by:  My Chart    Mode of Communication:  Video Conference via [facility]    Distant Location (Provider):  Off-site    As the provider I attest to compliance with applicable laws and regulations related to telemedicine.       DATA  Interactive Complexity: No  Crisis: No       Progress Since Last Session (Related to Symptoms / Goals / Homework):   Symptoms: No change      Homework: Achieved / completed to satisfaction      Episode of Care Goals: No improvement - CONTEMPLATION (Considering change and yet undecided); Intervened by assessing the negative and positive thinking (ambivalence) about behavior change     Current / Ongoing Stressors and Concerns:   Provided feedback regarding ADHD evaluation. Patient's questions were answered. Patient understood and agreed with feedback.     Treatment Objective(s)  Addressed in This Session:      ADHD evaluation completed. Feedback provided.     Intervention:   CBT: positive reinforcement, behavior modification  Emotion Focused Therapy: emotion checking  Motivational Interviewing: open ended questions    Assessments completed prior to visit:  The following assessments were completed by patient for this visit:  PHQ9:       8/1/2024     7:04 AM 3/18/2025     2:08 PM 5/28/2025    12:40 PM   PHQ-9 SCORE   PHQ-9 Total Score MyChart 21 (Severe depression) 12 (Moderate depression) 12 (Moderate depression)   PHQ-9 Total Score 21 12  12        Patient-reported     GAD7:       4/7/2025     3:04 PM   NAJMA-7 SCORE   Total Score 8 (mild anxiety)   Total Score 8        Patient-reported     CAGE-AID:       5/28/2025    12:52 PM   CAGE-AID Total Score   Total Score 2    Total Score MyChart 2 (A total score of 2 or greater is considered clinically significant)       Patient-reported     PROMIS 10-Global Health (all questions and answers displayed):       5/28/2025    12:52 PM 5/31/2025     9:27 AM   PROMIS 10   In general, would you say your health is: Good Good   In general, would you say your quality of life is: Very good Very good   In general, how would you rate your physical health? Good Good   In general, how would you rate your mental health, including your mood and your ability to think? Fair Fair   In general, how would you rate your satisfaction with your social activities and relationships? Very good Very good   In general, please rate how well you carry out your usual social activities and roles Very good Fair   To what extent are you able to carry out your everyday physical activities such as walking, climbing stairs, carrying groceries, or moving a chair? Completely Completely   In the past 7 days, how often have you been bothered by emotional problems such as feeling anxious, depressed, or irritable? Often Sometimes   In the past 7 days, how would you rate your fatigue on  average? Severe Severe   In the past 7 days, how would you rate your pain on average, where 0 means no pain, and 10 means worst imaginable pain? 0 0   In general, would you say your health is: 3 3   In general, would you say your quality of life is: 4 4   In general, how would you rate your physical health? 3 3   In general, how would you rate your mental health, including your mood and your ability to think? 2 2   In general, how would you rate your satisfaction with your social activities and relationships? 4 4   In general, please rate how well you carry out your usual social activities and roles. (This includes activities at home, at work and in your community, and responsibilities as a parent, child, spouse, employee, friend, etc.) 4 2   To what extent are you able to carry out your everyday physical activities such as walking, climbing stairs, carrying groceries, or moving a chair? 5 5   In the past 7 days, how often have you been bothered by emotional problems such as feeling anxious, depressed, or irritable? 4 3   In the past 7 days, how would you rate your fatigue on average? 4 4   In the past 7 days, how would you rate your pain on average, where 0 means no pain, and 10 means worst imaginable pain? 0 0   Global Mental Health Score 12  13    Global Physical Health Score 15  15    PROMIS TOTAL - SUBSCORES 27  28        Patient-reported     [place] Suicide Severity Rating Scale (Lifetime/Recent)      5/29/2025    11:34 AM   [place] Suicide Severity Rating (Lifetime/Recent)   1. Wish to be Dead (Lifetime) N   1. Wish to be Dead (Past 1 Month) N   2. Non-Specific Active Suicidal Thoughts (Lifetime) N   Actual Attempt (Lifetime) N   Has subject engaged in non-suicidal self-injurious behavior? (Lifetime) N   Interrupted Attempts (Lifetime) N   Aborted or Self-Interrupted Attempt (Lifetime) N   Preparatory Acts or Behavior (Lifetime) N   Calculated C-SSRS Risk Score (Lifetime/Recent) No Risk Indicated         ASSESSMENT: Current Emotional / Mental Status (status of significant symptoms):   Risk status (Self / Other harm or suicidal ideation)   Patient denies current fears or concerns for personal safety.   Patient denies current or recent suicidal ideation or behaviors.   Patientdenies current or recent homicidal ideation or behaviors.   Patient denies current or recent self injurious behavior or ideation.   Patient denies other safety concerns.   Patient reports there has been no change in risk factors since their last session.     Patientreports there has been no change in protective factors since their last session.     Recommended that patient call 911 or go to the local ED should there be a change in any of these risk factors     Appearance:   Appropriate    Eye Contact:   Good    Psychomotor Behavior: Normal    Attitude:   Cooperative  Interested Friendly Pleasant   Orientation:   All   Speech    Rate / Production: Normal/ Responsive Normal     Volume:  Normal    Mood:    Normal   Affect:    Appropriate    Thought Content:  Clear    Thought Form:  Coherent  Logical    Insight:    Good      Medication Review:   No changes to current psychiatric medication(s)     Medication Compliance:   Yes     Changes in Health Issues:   None reported     Chemical Use Review:   Not assessed    Diagnosis:  F90.2 Attention-Deficit/Hyperactivity Disorder, combined type  F32.0 Major Depressive Disorder, single episode, mild    Collateral Reports Completed:   Routed note to PCP    PLAN: (Patient Tasks / Therapist Tasks / Other)  Patient will follow-up with PCP for treatment recommendations.       Reanna Christopher, PhD    6/18/2025                                Psychological Testing  Billing/Services Summary       Testing Evaluation Services Base: 59028  (1st 60 mins) Add-on: 97480  (each addtl 60 mins)   Record Review and Clarify Referral Question   5/29/2025 11:56am-12:06pm  6/5/2025 3:27-3:42pm 25 minutes   Intra-Session  Clinical Decision Making   (Start/Stop), (Date, Day #) 0 minutes   Patient Symptom Management   (Start/Stop), (Date, Day #) 0 minutes   Clinical Decision Making/Battery Modification   (Start/Stop), (Date, Day #) 0 minutes   Integration/Report Generation   6/16/2025 3:45-3:55pm CNS  6/17/2025 12:40-12:50pm MMPI  6/18/2025 11:20am-12:00pm [place]  6/18/2025 12:00-1:10pm Report 130 minutes   Interactive Feedback Session  6/19/2025 10:00-10:19am 19 minutes   Post-Service Work   6/19/2025 10:19-10:30am 11 minutes   Total Time: 185 minutes (3 hours, 5 minutes)   Total Units: 1 2       Test Administration and Scoring Base: 40934  (1st 30 mins) Add-on: 10171  (each addtl 30 mins)   Test Administration (Face-to-Face)  (Start/Stop); (Start/Stop), (Date, Day #) 0 minutes   Scoring (Non-Face-to-Face)   (Start/Stop), (Date, Day #) 0 minutes   Total Time: 0 minutes (0 hours, 0 minutes)   Total Units: 0 0       Diagnosis(es): (ICD-10)  F90.2 Attention-Deficit/Hyperactivity Disorder, combined type  F32.0 Major Depressive Disorder, single episode, mild

## 2025-06-19 NOTE — Clinical Note
Greetings,  We completed the ADHD assessment and the patient was diagnosed with ADHD, Combined type.   In addition, they were diagnosed with:  Major Depressive Disorder  I encouraged them to schedule with you to discuss medication options and a possible referral to a sleep specialist.  Their full report is available for your review.   Please let me know if you have any questions.  Reanna Christopher, PhD LP

## 2025-06-23 ENCOUNTER — RESULTS FOLLOW-UP (OUTPATIENT)
Dept: FAMILY MEDICINE | Facility: CLINIC | Age: 25
End: 2025-06-23

## 2025-06-23 LAB — CV ZIO PRELIM RESULTS: NORMAL

## 2025-06-25 ENCOUNTER — OFFICE VISIT (OUTPATIENT)
Dept: FAMILY MEDICINE | Facility: CLINIC | Age: 25
End: 2025-06-25
Payer: COMMERCIAL

## 2025-06-25 VITALS
BODY MASS INDEX: 21.32 KG/M2 | WEIGHT: 148.9 LBS | TEMPERATURE: 98.3 F | SYSTOLIC BLOOD PRESSURE: 108 MMHG | HEART RATE: 86 BPM | RESPIRATION RATE: 14 BRPM | HEIGHT: 70 IN | DIASTOLIC BLOOD PRESSURE: 62 MMHG | OXYGEN SATURATION: 98 %

## 2025-06-25 DIAGNOSIS — F90.2 ADHD (ATTENTION DEFICIT HYPERACTIVITY DISORDER), COMBINED TYPE: Primary | ICD-10-CM

## 2025-06-25 DIAGNOSIS — R25.1 PHYSIOLOGICAL TREMOR: ICD-10-CM

## 2025-06-25 DIAGNOSIS — F51.01 PRIMARY INSOMNIA: ICD-10-CM

## 2025-06-25 DIAGNOSIS — F32.A ANXIETY AND DEPRESSION: ICD-10-CM

## 2025-06-25 DIAGNOSIS — F41.9 ANXIETY AND DEPRESSION: ICD-10-CM

## 2025-06-25 DIAGNOSIS — F32.2 CURRENT SEVERE EPISODE OF MAJOR DEPRESSIVE DISORDER WITHOUT PSYCHOTIC FEATURES WITHOUT PRIOR EPISODE (H): ICD-10-CM

## 2025-06-25 PROCEDURE — 99214 OFFICE O/P EST MOD 30 MIN: CPT | Performed by: STUDENT IN AN ORGANIZED HEALTH CARE EDUCATION/TRAINING PROGRAM

## 2025-06-25 PROCEDURE — 3078F DIAST BP <80 MM HG: CPT | Performed by: STUDENT IN AN ORGANIZED HEALTH CARE EDUCATION/TRAINING PROGRAM

## 2025-06-25 PROCEDURE — 3074F SYST BP LT 130 MM HG: CPT | Performed by: STUDENT IN AN ORGANIZED HEALTH CARE EDUCATION/TRAINING PROGRAM

## 2025-06-25 PROCEDURE — G2211 COMPLEX E/M VISIT ADD ON: HCPCS | Performed by: STUDENT IN AN ORGANIZED HEALTH CARE EDUCATION/TRAINING PROGRAM

## 2025-06-25 RX ORDER — METHYLPHENIDATE HYDROCHLORIDE 27 MG/1
27 TABLET ORAL DAILY
Qty: 30 TABLET | Refills: 0 | Status: SHIPPED | OUTPATIENT
Start: 2025-08-24 | End: 2025-09-23

## 2025-06-25 RX ORDER — METHYLPHENIDATE HYDROCHLORIDE 27 MG/1
27 TABLET ORAL DAILY
Qty: 30 TABLET | Refills: 0 | Status: SHIPPED | OUTPATIENT
Start: 2025-06-25 | End: 2025-07-25

## 2025-06-25 RX ORDER — METHYLPHENIDATE HYDROCHLORIDE 27 MG/1
27 TABLET ORAL DAILY
Qty: 30 TABLET | Refills: 0 | Status: SHIPPED | OUTPATIENT
Start: 2025-07-25 | End: 2025-08-24

## 2025-06-25 RX ORDER — ZOLPIDEM TARTRATE 12.5 MG/1
12.5 TABLET, FILM COATED, EXTENDED RELEASE ORAL
Qty: 30 TABLET | Refills: 0 | Status: SHIPPED | OUTPATIENT
Start: 2025-06-25

## 2025-06-25 RX ORDER — VENLAFAXINE 37.5 MG/1
37.5 TABLET ORAL DAILY
Qty: 90 TABLET | Refills: 2 | OUTPATIENT
Start: 2025-06-25

## 2025-06-25 ASSESSMENT — PATIENT HEALTH QUESTIONNAIRE - PHQ9
SUM OF ALL RESPONSES TO PHQ QUESTIONS 1-9: 11
SUM OF ALL RESPONSES TO PHQ QUESTIONS 1-9: 11
10. IF YOU CHECKED OFF ANY PROBLEMS, HOW DIFFICULT HAVE THESE PROBLEMS MADE IT FOR YOU TO DO YOUR WORK, TAKE CARE OF THINGS AT HOME, OR GET ALONG WITH OTHER PEOPLE: VERY DIFFICULT

## 2025-06-25 NOTE — PROGRESS NOTES
Assessment & Plan     ADHD (attention deficit hyperactivity disorder), combined type:  - ADHD diagnosis confirmed by psychology.  - Start Concerta at 27 mg daily, taken with breakfast. Prescription for 3 months, to be picked up every 30 days. Follow-up in one month if medication is not effective, otherwise in three months.  - Risks and side effects: Potential side effects include palpitations, chest pain, weight loss, dry mouth, headaches, irritability. Patient informed to report any palpitations or chest pain.    Primary insomnia:  - Insomnia with frequent nighttime awakenings.  Waking 2-3 times at night on Ambien versus 5-7 times a night without Ambien.  No side effects noted.  - Continue Ambien, increasing dose to 12.5 mg extended release as current dose is insufficient. Monitor for side effects.  - Risks and side effects: Increased risk of side effects with higher dose. Patient informed.    Current severe episode of major depressive disorder without psychotic features without prior episode (H):  - Considering discontinuation of venlafaxine (Effexor) 37.5 mg.  - Patient can stop venlafaxine now or wait until ADHD medication dose is stabilized. If withdrawal symptoms occur, take venlafaxine every other day for a week before stopping completely.    Hand tremors:  - Long-standing hand tremors, possibly exacerbated by Wellbutrin and stimulants.  - Monitor tremors, especially with new ADHD medication. If tremors worsen or interfere significantly with daily activities, reassess.  - Risks and side effects: Potential worsening of tremors with stimulant medication. Patient informed.    Consent was obtained from the patient to use an AI documentation tool in the creation of this note.    The longitudinal plan of care for the diagnosis(es)/condition(s) as documented were addressed during this visit. Due to the added complexity in care, I will continue to support Trae in the subsequent management and with ongoing  "continuity of care.          Follow-up   In 3 months in person.  May follow-up sooner in 1 month if Concerta dose is not effective.    Subjective   Trae is a 24 year old, presenting for the following health issues:  Follow Up (Discuss new dx of ADHD and medications.)        6/25/2025     8:50 AM   Additional Questions   Roomed by MARIA R FRANCES     History of Present Illness       Reason for visit:  ADHD Diagnosis follow-up    He eats 2-3 servings of fruits and vegetables daily.He consumes 1 sweetened beverage(s) daily.He exercises with enough effort to increase his heart rate 30 to 60 minutes per day.  He exercises with enough effort to increase his heart rate 5 days per week.   He is taking medications regularly.          History of Present Illness-  Trae Gilmore, 24 years old, male  - ADHD symptoms, considering treatment options. Discussed long-acting options like Concerta and Adderall XR. Decided to start Concerta at a lower dose of 27 mg due to concerns about heart rate.  - Mood has been good, considering weaning off venlafaxine. Discussed stopping it now or waiting until ADHD medication dose is stabilized. Minimal withdrawal expected.  - Sleep disturbances, waking up frequently during the night, more noticeable without Ambien. Considering increasing Ambien dose to 12.5 mg extended release if current dose is insufficient.  - Hand tremors, present for a long time, more noticeable recently, affecting handwriting. Wellbutrin and stimulants might exacerbate the tremors.          Review of Systems  Constitutional, neuro, ENT, endocrine, pulmonary, cardiac, gastrointestinal, genitourinary, musculoskeletal, integument and psychiatric systems are negative, except as otherwise noted.      Objective    /62 (BP Location: Right arm, Patient Position: Sitting, Cuff Size: Adult Regular)   Pulse 86   Temp 98.3  F (36.8  C)   Resp 14   Ht 1.778 m (5' 10\")   Wt 67.5 kg (148 lb 14.4 oz)   SpO2 98%   BMI 21.36 kg/m  "   Body mass index is 21.36 kg/m .  Physical Exam   GENERAL: alert and no distress  CV: regular rate and rhythm, normal S1 S2, no S3 or S4, no murmur, click or rub, no peripheral edema  MS: no gross musculoskeletal defects noted, no edema  PSYCH: mentation appears normal, affect normal/bright    Orders Only (auto-released) on 06/04/2025   Component Date Value Ref Range Status    [facility] Prelim Results 06/23/2025    Preliminary                    Value:Patient had a min HR of 50 bpm, max HR of 150 bpm, and avg HR of 93 bpm. Predominant underlying rhythm was Sinus Rhythm. Slight P wave morphology changes were noted. Isolated SVEs were rare (<1.0%), and no SVE Couplets or SVE Triplets were present. Isolated VEs were rare (<1.0%), and no VE Couplets or VE Triplets were present.             Signed Electronically by: Bill Miller MD

## 2025-06-25 NOTE — PATIENT INSTRUCTIONS
Based on our discussion, I have outlined the following instructions for you:      - Start taking Concerta at 27 mg every day with your breakfast. You have a prescription for 3 months, and you need to pick it up every 30 days. If the medication doesn't seem to work, schedule a follow-up in one month. Otherwise, plan to follow up in three months.  - Be aware of possible side effects like feeling your heart race, chest pain, losing weight, dry mouth, headaches, or feeling irritable. If you feel your heart racing or have chest pain, let someone know right away.  - Keep taking Ambien. We are increasing the dose to 12.5 mg extended release. Keep an eye out for any side effects.  - You might stop taking venlafaxine (Effexor) 37.5 mg once we get ADHD under control.  When we stop this, If you feel any withdrawal symptoms, take venlafaxine every other day for a week before stopping completely.  - Keep an eye on your hand tremors, especially with the new ADHD medication. If the tremors get worse or make daily activities hard, it might be time to reassess.    Thank you again for your visit, and we look forward to supporting you in your journey to better health.    
PMD

## 2025-07-12 ENCOUNTER — MYC MEDICAL ADVICE (OUTPATIENT)
Dept: FAMILY MEDICINE | Facility: CLINIC | Age: 25
End: 2025-07-12
Payer: COMMERCIAL

## 2025-07-20 ENCOUNTER — MYC REFILL (OUTPATIENT)
Dept: FAMILY MEDICINE | Facility: CLINIC | Age: 25
End: 2025-07-20
Payer: COMMERCIAL

## 2025-07-20 DIAGNOSIS — F32.2 CURRENT SEVERE EPISODE OF MAJOR DEPRESSIVE DISORDER WITHOUT PSYCHOTIC FEATURES WITHOUT PRIOR EPISODE (H): ICD-10-CM

## 2025-07-21 RX ORDER — BUPROPION HYDROCHLORIDE 150 MG/1
150 TABLET ORAL EVERY MORNING
Qty: 90 TABLET | Refills: 3 | Status: SHIPPED | OUTPATIENT
Start: 2025-07-21

## 2025-07-21 RX ORDER — BUPROPION HYDROCHLORIDE 300 MG/1
300 TABLET ORAL EVERY MORNING
Qty: 90 TABLET | Refills: 3 | Status: SHIPPED | OUTPATIENT
Start: 2025-07-21

## 2025-07-28 DIAGNOSIS — F51.01 PRIMARY INSOMNIA: ICD-10-CM

## 2025-07-28 RX ORDER — ZOLPIDEM TARTRATE 12.5 MG/1
12.5 TABLET, FILM COATED, EXTENDED RELEASE ORAL
Qty: 30 TABLET | Refills: 0 | Status: SHIPPED | OUTPATIENT
Start: 2025-07-28

## 2025-07-29 ENCOUNTER — TELEPHONE (OUTPATIENT)
Dept: FAMILY MEDICINE | Facility: CLINIC | Age: 25
End: 2025-07-29
Payer: COMMERCIAL

## 2025-08-12 ASSESSMENT — PATIENT HEALTH QUESTIONNAIRE - PHQ9
10. IF YOU CHECKED OFF ANY PROBLEMS, HOW DIFFICULT HAVE THESE PROBLEMS MADE IT FOR YOU TO DO YOUR WORK, TAKE CARE OF THINGS AT HOME, OR GET ALONG WITH OTHER PEOPLE: EXTREMELY DIFFICULT
SUM OF ALL RESPONSES TO PHQ QUESTIONS 1-9: 19
SUM OF ALL RESPONSES TO PHQ QUESTIONS 1-9: 19

## 2025-08-13 ENCOUNTER — OFFICE VISIT (OUTPATIENT)
Dept: FAMILY MEDICINE | Facility: CLINIC | Age: 25
End: 2025-08-13
Payer: COMMERCIAL

## 2025-08-13 DIAGNOSIS — F90.2 ADHD (ATTENTION DEFICIT HYPERACTIVITY DISORDER), COMBINED TYPE: Primary | ICD-10-CM

## 2025-08-13 DIAGNOSIS — R00.0 TACHYCARDIA: ICD-10-CM

## 2025-08-13 DIAGNOSIS — F51.01 PRIMARY INSOMNIA: ICD-10-CM

## 2025-08-13 DIAGNOSIS — F32.0 MDD (MAJOR DEPRESSIVE DISORDER), SINGLE EPISODE, MILD: ICD-10-CM

## 2025-08-13 DIAGNOSIS — M54.2 NECK PAIN: ICD-10-CM

## 2025-08-13 PROCEDURE — 99214 OFFICE O/P EST MOD 30 MIN: CPT | Performed by: STUDENT IN AN ORGANIZED HEALTH CARE EDUCATION/TRAINING PROGRAM

## 2025-08-13 PROCEDURE — 3078F DIAST BP <80 MM HG: CPT | Performed by: STUDENT IN AN ORGANIZED HEALTH CARE EDUCATION/TRAINING PROGRAM

## 2025-08-13 PROCEDURE — 3074F SYST BP LT 130 MM HG: CPT | Performed by: STUDENT IN AN ORGANIZED HEALTH CARE EDUCATION/TRAINING PROGRAM

## 2025-08-13 PROCEDURE — G2211 COMPLEX E/M VISIT ADD ON: HCPCS | Performed by: STUDENT IN AN ORGANIZED HEALTH CARE EDUCATION/TRAINING PROGRAM

## 2025-08-13 RX ORDER — DEXTROAMPHETAMINE SACCHARATE, AMPHETAMINE ASPARTATE, DEXTROAMPHETAMINE SULFATE AND AMPHETAMINE SULFATE 2.5; 2.5; 2.5; 2.5 MG/1; MG/1; MG/1; MG/1
10 TABLET ORAL 2 TIMES DAILY
Qty: 60 TABLET | Refills: 0 | Status: SHIPPED | OUTPATIENT
Start: 2025-08-13 | End: 2025-09-12

## 2025-08-13 RX ORDER — DEXTROAMPHETAMINE SACCHARATE, AMPHETAMINE ASPARTATE, DEXTROAMPHETAMINE SULFATE AND AMPHETAMINE SULFATE 2.5; 2.5; 2.5; 2.5 MG/1; MG/1; MG/1; MG/1
10 TABLET ORAL 2 TIMES DAILY
Qty: 60 TABLET | Refills: 0 | Status: SHIPPED | OUTPATIENT
Start: 2025-10-12 | End: 2025-11-11

## 2025-08-13 RX ORDER — DEXTROAMPHETAMINE SACCHARATE, AMPHETAMINE ASPARTATE, DEXTROAMPHETAMINE SULFATE AND AMPHETAMINE SULFATE 2.5; 2.5; 2.5; 2.5 MG/1; MG/1; MG/1; MG/1
10 TABLET ORAL 2 TIMES DAILY
Qty: 60 TABLET | Refills: 0 | Status: SHIPPED | OUTPATIENT
Start: 2025-09-12 | End: 2025-10-12

## 2025-08-14 VITALS
WEIGHT: 147.7 LBS | BODY MASS INDEX: 21.15 KG/M2 | TEMPERATURE: 98.3 F | HEART RATE: 102 BPM | DIASTOLIC BLOOD PRESSURE: 74 MMHG | OXYGEN SATURATION: 99 % | HEIGHT: 70 IN | SYSTOLIC BLOOD PRESSURE: 128 MMHG | RESPIRATION RATE: 18 BRPM

## 2025-08-25 ENCOUNTER — MYC REFILL (OUTPATIENT)
Dept: FAMILY MEDICINE | Facility: CLINIC | Age: 25
End: 2025-08-25
Payer: COMMERCIAL

## 2025-08-25 DIAGNOSIS — F51.01 PRIMARY INSOMNIA: ICD-10-CM

## 2025-08-25 RX ORDER — ZOLPIDEM TARTRATE 12.5 MG/1
12.5 TABLET, FILM COATED, EXTENDED RELEASE ORAL
Qty: 30 TABLET | Refills: 0 | Status: SHIPPED | OUTPATIENT
Start: 2025-08-25

## 2025-08-26 ENCOUNTER — PATIENT OUTREACH (OUTPATIENT)
Dept: CARE COORDINATION | Facility: CLINIC | Age: 25
End: 2025-08-26
Payer: COMMERCIAL

## 2025-09-02 DIAGNOSIS — F90.2 ADHD (ATTENTION DEFICIT HYPERACTIVITY DISORDER), COMBINED TYPE: ICD-10-CM

## 2025-09-02 RX ORDER — DEXTROAMPHETAMINE SACCHARATE, AMPHETAMINE ASPARTATE, DEXTROAMPHETAMINE SULFATE AND AMPHETAMINE SULFATE 3.75; 3.75; 3.75; 3.75 MG/1; MG/1; MG/1; MG/1
15 TABLET ORAL 2 TIMES DAILY
Qty: 60 TABLET | Refills: 0 | Status: SHIPPED | OUTPATIENT
Start: 2025-09-12

## 2025-09-02 RX ORDER — DEXTROAMPHETAMINE SACCHARATE, AMPHETAMINE ASPARTATE, DEXTROAMPHETAMINE SULFATE AND AMPHETAMINE SULFATE 3.75; 3.75; 3.75; 3.75 MG/1; MG/1; MG/1; MG/1
15 TABLET ORAL 2 TIMES DAILY
Qty: 60 TABLET | Refills: 0 | Status: SHIPPED | OUTPATIENT
Start: 2025-10-12